# Patient Record
Sex: FEMALE | Race: WHITE | NOT HISPANIC OR LATINO | Employment: UNEMPLOYED | URBAN - METROPOLITAN AREA
[De-identification: names, ages, dates, MRNs, and addresses within clinical notes are randomized per-mention and may not be internally consistent; named-entity substitution may affect disease eponyms.]

---

## 2018-02-09 ENCOUNTER — TRANSFERRED RECORDS (OUTPATIENT)
Dept: HEALTH INFORMATION MANAGEMENT | Facility: CLINIC | Age: 53
End: 2018-02-09

## 2018-03-14 ENCOUNTER — OFFICE VISIT (OUTPATIENT)
Dept: CARDIOLOGY | Facility: CLINIC | Age: 53
End: 2018-03-14
Payer: COMMERCIAL

## 2018-03-14 ENCOUNTER — OFFICE VISIT (OUTPATIENT)
Dept: INTERNAL MEDICINE | Facility: CLINIC | Age: 53
End: 2018-03-14
Payer: COMMERCIAL

## 2018-03-14 VITALS
WEIGHT: 282 LBS | SYSTOLIC BLOOD PRESSURE: 126 MMHG | TEMPERATURE: 97.1 F | BODY MASS INDEX: 44.26 KG/M2 | OXYGEN SATURATION: 96 % | DIASTOLIC BLOOD PRESSURE: 70 MMHG | HEART RATE: 94 BPM | HEIGHT: 67 IN

## 2018-03-14 VITALS
OXYGEN SATURATION: 100 % | WEIGHT: 282 LBS | DIASTOLIC BLOOD PRESSURE: 70 MMHG | SYSTOLIC BLOOD PRESSURE: 126 MMHG | BODY MASS INDEX: 44.26 KG/M2 | HEIGHT: 67 IN | HEART RATE: 70 BPM

## 2018-03-14 DIAGNOSIS — H53.9 VISUAL DISTURBANCE: Primary | ICD-10-CM

## 2018-03-14 DIAGNOSIS — R00.2 PALPITATIONS: Primary | ICD-10-CM

## 2018-03-14 DIAGNOSIS — R07.89 ATYPICAL CHEST PAIN: ICD-10-CM

## 2018-03-14 DIAGNOSIS — E73.9 LACTOSE INTOLERANCE IN ADULT: ICD-10-CM

## 2018-03-14 DIAGNOSIS — D47.09 MAST CELL DISORDER: ICD-10-CM

## 2018-03-14 PROCEDURE — 99204 OFFICE O/P NEW MOD 45 MIN: CPT | Performed by: INTERNAL MEDICINE

## 2018-03-14 RX ORDER — EPINEPHRINE 0.3 MG/.3ML
0.3 INJECTION SUBCUTANEOUS PRN
COMMUNITY

## 2018-03-14 RX ORDER — ALBUTEROL SULFATE 90 UG/1
2 AEROSOL, METERED RESPIRATORY (INHALATION) EVERY 4 HOURS PRN
COMMUNITY

## 2018-03-14 RX ORDER — ALPRAZOLAM 0.25 MG
0.25 TABLET ORAL 3 TIMES DAILY PRN
COMMUNITY
End: 2018-05-17 | Stop reason: SINTOL

## 2018-03-14 RX ORDER — ONDANSETRON 4 MG/1
4 TABLET, FILM COATED ORAL PRN
COMMUNITY

## 2018-03-14 RX ORDER — SIMETHICONE 125 MG
125 TABLET,CHEWABLE ORAL 4 TIMES DAILY PRN
COMMUNITY

## 2018-03-14 RX ORDER — SOLIFENACIN SUCCINATE 10 MG/1
10 TABLET, FILM COATED ORAL DAILY
COMMUNITY

## 2018-03-14 RX ORDER — ACETAMINOPHEN 500 MG
500-1000 TABLET ORAL EVERY 6 HOURS PRN
COMMUNITY

## 2018-03-14 RX ORDER — LOPERAMIDE HCL 2 MG
2 CAPSULE ORAL PRN
COMMUNITY

## 2018-03-14 RX ORDER — TRAMADOL HYDROCHLORIDE 50 MG/1
50 TABLET ORAL PRN
COMMUNITY

## 2018-03-14 RX ORDER — SUCRALFATE ORAL 1 G/10ML
1 SUSPENSION ORAL 4 TIMES DAILY
COMMUNITY

## 2018-03-14 RX ORDER — CHOLECALCIFEROL (VITAMIN D3) 50 MCG
TABLET ORAL DAILY
COMMUNITY

## 2018-03-14 RX ORDER — FAMOTIDINE 20 MG/1
20 TABLET, FILM COATED ORAL 2 TIMES DAILY
COMMUNITY

## 2018-03-14 RX ORDER — METHOCARBAMOL 750 MG/1
TABLET, FILM COATED ORAL 3 TIMES DAILY PRN
COMMUNITY

## 2018-03-14 RX ORDER — TRIAMCINOLONE ACETONIDE 0.25 MG/G
CREAM TOPICAL PRN
COMMUNITY

## 2018-03-14 ASSESSMENT — PAIN SCALES - GENERAL: PAINLEVEL: NO PAIN (0)

## 2018-03-14 NOTE — PROGRESS NOTES
SUBJECTIVE:   Lyla Reyes is a 52 year old female who presents to clinic today for the following health issues:      Chief Complaint   Patient presents with     Establish Care     discuss several health issues     Patient is here for a second opinion she has multiple different concerns she sees multiple specialists including GI, psychiatry, ophthalmology and neuro-ophthalmology.  And allergy.    She is not happy with her primary because she does not get results back and has been told the results are normal and another doctor say that they are abnormal.    Some deficiencies and getting b12 shots.      Then saw allergist, Dr Craig in [facility].  Allergic to beef, chicken, tree nuts, lactose, and gluten.    Shingles and nerve pain for 7 weeks.     Seen neuro-opthamologist and has had some blurred vision.    Some atypical chest pain with the shingles then ekg maybe showed enlarged heart.      Question of her vision disturbance.  This has happened many times since the fall.  Has seen opthamology and they thought it was a stroke or migraine.  Nothing seen from her eyes.  Eye associate in [facility].  Never has had an echo in the past, opthamology wondered about a clot in the heart and strokes.  Had brain MRI last week at [facility], that was normal per her report.  No headaches with her visual changes.  Eyes get tired then a headache.      No past medical history on file.  Limited as the patient gave me current information not past information    No past surgical history on file.   Previous stomach surgery for a paraesophageal hernia    Current Outpatient Prescriptions   Medication     ondansetron (ZOFRAN) 4 MG tablet     traMADol (ULTRAM) 50 MG tablet     bismuth subsalicylate (PEPTO BISMOL) 262 MG/15ML suspension     sucralfate (CARAFATE) 1 GM/10ML suspension     EPINEPHrine (EPIPEN/ADRENACLICK/OR ANY BX GENERIC EQUIV) 0.3 MG/0.3ML injection 2-pack     famotidine (PEPCID) 20 MG tablet     simethicone (GAS RELIEF EXTRA  "STRENGTH) 125 MG CHEW chewable tablet     loperamide (IMODIUM) 2 MG capsule     acetaminophen (TYLENOL) 500 MG tablet     methocarbamol (ROBAXIN) 750 MG tablet     solifenacin (VESICARE) 10 MG tablet     triamcinolone (KENALOG) 0.025 % cream     albuterol (PROAIR HFA/PROVENTIL HFA/VENTOLIN HFA) 108 (90 BASE) MCG/ACT Inhaler     Cholecalciferol (VITAMIN D3) 2000 UNITS TABS     ALPRAZolam (XANAX) 0.25 MG tablet     No current facility-administered medications for this visit.    Most of her medications are as needed only taking the Pepcid on a daily basis    Social History   Substance Use Topics     Smoking status: Former Smoker     Smokeless tobacco: Never Used      Comment: quit in 1985     Alcohol use Not on file     Patient lives in [place]    Physical Exam  /70  Pulse 94  Temp 97.1  F (36.2  C) (Temporal)  Ht 5' 7\" (1.702 m)  Wt 282 lb (127.9 kg)  SpO2 96%  BMI 44.17 kg/m2  General Appearance-healthy, alert, no distress  Walks with a walker, wearing glasses    ASSESSMENT:  52-year-old very complex woman with a long medical history comes in looking for a second opinion mostly concerned about her vision which is changed over the last couple months.  She has had blurred vision and episodes.  During these episodes she seen ophthalmology and seen neuro-ophthalmology.  Those records are not available.  They tell her it is not her eyes but possibly a migraine.  She also reports that they said there was some grainy findings on her eye exam management back in 3 days and those were gone.  She did have an MRI done and that report is with her and shows no signs of stroke, no abnormalities.    We discussed the possibility of a migraine but she really has no headaches or prodrome.  Discussed that migraines can give atypical symptoms.  Could be related to diet, lack of sleep once the trigger for her migraine.  She already has allergies to foods and is on a limited diet.  I recommended she see neurology and offered " a neurology referral to [facility] but she lives in [place].    Obviously the patient is frustrated I have no obvious answer for her today.  We discussed the best option to find a new primary in [place] where she lives she can follow-up with me but would need to be seen basically on a weekly basis to learn her health history on the complexities of it.  Referrals with them he made the [place] which will be further from her home.    I did tell her I am not able to treat her mast cell disease as I don't see that very often.  Recommended referral to the [facility], unfortunately Dr Bartholomew has left there.    The patient left the clinic was going down to an appointment in cardiology.    I spent greater than 50% of this 45 minute visit in counseling and coordination of care in direct face to face care, discussing work up and visual concerns.      Electronically signed by Hugo Knowles MD

## 2018-03-14 NOTE — PROGRESS NOTES
"HISTORY:     Lyla Reyes is a pleasant 52-year-old female presenting with her 2 daughters today.  He is here for evaluation of chest pain and palpitations.  She has no documented previous cardiac diagnoses and her only cardiac risk factors are obesity and a positive family history (father underwent bypass surgery in his late 40s).    Lyla reports that he has a history of shingles and has chronic chest discomfort seems more superficial and has always been attributed to her previous shingles.  About a month ago she had an episode of chest tightness which felt different than her usual chest discomfort and was associated with intermittent shooting pains.  The discomfort was intermittent itself lasting 1 or 2 minutes at a time and occurring over about an hour or so.  This discomfort radiated to her left arm and shoulder with aching and numbness.  He was also associated with nausea and vomiting and a sense of lightheadedness.  She also states that \"I just did not feel right\".  She was seen in urgent care in [place] where her troponin was found to be negative and ECG showed no ischemic changes, and she was discharged.  She reports that she has not had any previous cardiac evaluation other than a stress test which was more than 7 years ago, and a Holter monitor done about a year ago.    The patient also reports that she has had palpitations dating back for many years.  They te patient reports that while she was monitored she had her typical palpitations and she did progress her nd to wax and wane in severity coming on for a day or 2 and then resolving for several days.  In general she has palpitations every couple days.  These episodes tend to be random and she has a sense of her heart racing.  Sometimes she is lightheaded and feels like she may pass out.  She recalls one syncopal episode but that was at answered about a decade ago.  She feels that generally these palpitations are random but she thinks that if she is " under a lot of stress she may be more likely to have palpitations, although this relationship is not 1-1.    I reviewed the Holter monitor done at [facility] in April 2017.  That study showed 47 PACs, a 10 beat run of PAT, and 21 isolated PVCs.  The patient reports that while monitored she had her typical symptoms and she did make 9 diary entries of palpitations which seemed to correlate with brief PAT at times.  Shortness of breath did not correlate with any arrhythmias and her sense of fluttering occurred with PVCs.    Cardiac risk factors are mostly negative.  Patient smoked for 1 year when she was young and is not currently using cigarettes.  She has prediabetes but has not been diagnosed as having diabetes.  Her cholesterol has always been normal though I do not have those values to personally review.  There is no history of hypertension and as mentioned above her father underwent bypass surgery in his late 40s, no other known family history of CAD.      ASSESSMENT/PLAN:    1.  Chest pain.  Fairly atypical, no exertional component.  Cardiac risk factors are fairly minimal.  The patient had a hysterectomy but not an oophorectomy more than a decade ago and she is not sure if she has gone through menopause yet.  I reviewed her outside ECG which shows Q waves in leads III and aVF suggesting a previous inferior infarct.  I will arrange an echocardiogram to evaluate for wall motion abnormalities and structural abnormalities, as well as a Lexiscan study to evaluate for ischemia.  2.  Palpitations.  She has had a full evaluation including a Holter monitor.  Her echocardiogram will assure us that she does not have structural abnormalities, although her exam does not suggest such a problem.  I talked to her about the possibility of initiating beta-blocker as an antiarrhythmic, and because she has some episodes of PAT which is to be part of her issue with palpitations.  At this point she wants to think more about whether  she wants to get another medication.  I do not think it is terribly important from a safety standpoint that she use a beta-blocker.  I will plan on having her back in a month and we will review that discussion then.    Thank you for asking me to participate in your patient's care.  Please do not hesitate to call if I can be of further assistance.    Orders Placed This Encounter   Procedures     NM Lexiscan stress test (nuc card)     Follow-Up with Cardiologist     Echocardiogram     No orders of the defined types were placed in this encounter.    There are no discontinued medications.      Encounter Diagnoses   Name Primary?     Palpitations Yes     Atypical chest pain        CURRENT MEDICATIONS:  Current Outpatient Prescriptions   Medication Sig Dispense Refill     ondansetron (ZOFRAN) 4 MG tablet Take 4 mg by mouth as needed for nausea       traMADol (ULTRAM) 50 MG tablet Take 50 mg by mouth as needed for moderate pain       bismuth subsalicylate (PEPTO BISMOL) 262 MG/15ML suspension Take 15 mLs by mouth every 4 hours as needed for indigestion       sucralfate (CARAFATE) 1 GM/10ML suspension Take 1 g by mouth 4 times daily       EPINEPHrine (EPIPEN/ADRENACLICK/OR ANY BX GENERIC EQUIV) 0.3 MG/0.3ML injection 2-pack Inject 0.3 mg into the muscle as needed for anaphylaxis       famotidine (PEPCID) 20 MG tablet Take 20 mg by mouth 2 times daily       simethicone (GAS RELIEF EXTRA STRENGTH) 125 MG CHEW chewable tablet Take 125 mg by mouth 4 times daily as needed for intestinal gas       loperamide (IMODIUM) 2 MG capsule Take 2 mg by mouth as needed for diarrhea       acetaminophen (TYLENOL) 500 MG tablet Take 500-1,000 mg by mouth every 6 hours as needed for mild pain       methocarbamol (ROBAXIN) 750 MG tablet Take by mouth 3 times daily as needed for muscle spasms       solifenacin (VESICARE) 10 MG tablet Take 10 mg by mouth daily       triamcinolone (KENALOG) 0.025 % cream Apply topically as needed for irritation        albuterol (PROAIR HFA/PROVENTIL HFA/VENTOLIN HFA) 108 (90 BASE) MCG/ACT Inhaler Inhale 2 puffs into the lungs every 4 hours as needed for shortness of breath / dyspnea or wheezing       Cholecalciferol (VITAMIN D3) 2000 UNITS TABS Take by mouth daily       ALPRAZolam (XANAX) 0.25 MG tablet Take 0.25 mg by mouth 3 times daily as needed for anxiety         ALLERGIES     Allergies   Allergen Reactions     Avocado      Beef-Derived Products      Bees      Chicken Allergy      Ciprofloxacin Other (See Comments)     Muscle pain, sob, rib and chest pain     Clomipramine Other (See Comments)     Mood changes, aggression, irritability     Clonazepam Other (See Comments)     Depression, suicidal     Doxycycline Other (See Comments)     Vomiting, headache, nausea     Escitalopram Hives     Gluten Meal      Grass      Hylan G-F 20 Other (See Comments)     Swelling, pain and stiffness     Lactose      Lorazepam Other (See Comments)     Suicidal, mood changes, irritability, depression     Mildew      Milnacipran Other (See Comments)     Suicidal, mood changes     Mold      Nefazodone Other (See Comments)     Nausea, vomiting, headache     Oxycodone Other (See Comments)     euphoria     Paroxetine Hives     Pregabalin Other (See Comments)     Extreme swelling in feet and legs     Prozac [Fluoxetine] Other (See Comments)     Vomiting and dehydration     Topiramate Other (See Comments)     Severe cough and sob     Tree Nuts [Nuts]      Vilazodone Other (See Comments)     Muscle pain, sob, rib and chest pain     Gabapentin Rash       PAST MEDICAL HISTORY:  No past medical history on file.    PAST SURGICAL HISTORY:  No past surgical history on file.    FAMILY HISTORY:  No family history on file.    SOCIAL HISTORY:  Social History     Social History     Marital status: Single     Spouse name: N/A     Number of children: N/A     Years of education: N/A     Social History Main Topics     Smoking status: Former Smoker     Smokeless  "tobacco: Never Used      Comment: quit in 1985     Alcohol use Not on file     Drug use: Not on file     Sexual activity: Not Currently     Other Topics Concern     Not on file     Social History Narrative     No narrative on file       Review of Systems:  Skin:  Negative     Eyes:  Positive for glasses  ENT:  Negative    Respiratory:  Positive for shortness of breath  Cardiovascular:  Negative for;edema Positive for;chest pain;lightheadedness;dizziness;fatigue;palpitations  Gastroenterology: Negative    Genitourinary:  Negative    Musculoskeletal:  Negative    Neurologic:  Negative    Psychiatric:  Positive for sleep disturbances  Heme/Lymph/Imm:  Positive for allergies  Endocrine:  Positive for      Physical Exam:  Vitals: /70  Pulse 70  Ht 1.702 m (5' 7\")  Wt 127.9 kg (282 lb)  SpO2 100%  BMI 44.17 kg/m2    Constitutional:  cooperative, alert and oriented, well developed, well nourished, in no acute distress morbidly obese      Skin:  warm and dry to the touch        Head:  normocephalic        Eyes:  no xanthalasma        ENT:  no pallor or cyanosis, dentition good        Neck:  carotid pulses are full and equal bilaterally, JVP normal, no carotid bruit        Chest:  normal breath sounds, clear to auscultation, normal A-P diameter, normal symmetry, normal respiratory excursion, no use of accessory muscles        Cardiac: regular rhythm, normal S1/S2, no S3 or S4, apical impulse not displaced, no murmurs, gallops or rubs                  Abdomen:  abdomen soft;BS normoactive        Vascular: pulses full and equal                                      Extremities and Back:           Neurological:  no gross motor deficits          Recent Lab Results:  LIPID RESULTS:  No results found for: CHOL, HDL, LDL, TRIG, CHOLHDLRATIO    LIVER ENZYME RESULTS:  No results found for: AST, ALT    CBC RESULTS:  No results found for: WBC, RBC, HGB, HCT, MCV, MCH, MCHC, RDW, PLT    BMP RESULTS:  No results found for: NA, " POTASSIUM, CHLORIDE, CO2, ANIONGAP, GLC, BUN, CR, GFRESTIMATED, GFRESTBLACK, TALA     A1C RESULTS:  No results found for: A1C    INR RESULTS:  No results found for: INR      Minesh Marquez MD, [facility]    CC  No referring provider defined for this encounter.

## 2018-03-14 NOTE — NURSING NOTE
"Chief Complaint   Patient presents with     Establish Care     discuss several health issues       Initial /70  Pulse 94  Temp 97.1  F (36.2  C) (Temporal)  Ht 5' 7\" (1.702 m)  Wt 282 lb (127.9 kg)  SpO2 96%  BMI 44.17 kg/m2 Estimated body mass index is 44.17 kg/(m^2) as calculated from the following:    Height as of this encounter: 5' 7\" (1.702 m).    Weight as of this encounter: 282 lb (127.9 kg).  Medication Reconciliation: complete    "

## 2018-03-14 NOTE — MR AVS SNAPSHOT
"              After Visit Summary   3/14/2018    Lyla Reyes    MRN: [id number]           Patient Information     Date Of Birth          1965        Visit Information        Provider Department      3/14/2018 10:00 AM Minesh Marquez MD [facility]        Today's Diagnoses     Palpitations    -  1    Atypical chest pain           Follow-ups after your visit        Additional Services     Follow-Up with Cardiologist                 Future tests that were ordered for you today     Open Future Orders        Priority Expected Expires Ordered    Follow-Up with Cardiologist Routine 4/13/2018 3/14/2019 3/14/2018    NM Lexiscan stress test (nuc card) Routine 3/21/2018 3/14/2019 3/14/2018    Echocardiogram Routine 3/21/2018 3/14/2019 3/14/2018            Who to contact     If you have questions or need follow up information about today's clinic visit or your schedule please contact [facility] directly at [phone].  Normal or non-critical lab and imaging results will be communicated to you by [employer], letter or phone within 4 business days after the clinic has received the results. If you do not hear from us within 7 days, please contact the clinic through [employer] or phone. If you have a critical or abnormal lab result, we will notify you by phone as soon as possible.  Submit refill requests through [employer] or call your pharmacy and they will forward the refill request to us. Please allow 3 business days for your refill to be completed.          Additional Information About Your Visit        Smalldealshart Information     [employer] lets you send messages to your doctor, view your test results, renew your prescriptions, schedule appointments and more. To sign up, go to [email or web address] . Click on \"Log in\" on the left side of the screen, which will take you to the Welcome page. Then click on \"Sign up Now\" on the right side " "of the page.     You will be asked to enter the access code listed below, as well as some personal information. Please follow the directions to create your username and password.     Your access code is: [email or web address]  Expires: 2018  8:23 AM     Your access code will  in 90 days. If you need help or a new code, please call your [place] clinic or [phone].        Care EveryWhere ID     This is your Care EveryWhere ID. This could be used by other organizations to access your [place] medical records  [id number]        Your Vitals Were     Pulse Height Pulse Oximetry BMI (Body Mass Index)          70 1.702 m (5' 7\") 100% 44.17 kg/m2         Blood Pressure from Last 3 Encounters:   18 126/70   18 126/70    Weight from Last 3 Encounters:   18 127.9 kg (282 lb)   18 127.9 kg (282 lb)               Primary Care Provider Office Phone # Fax #    Hugo Knowles [phone]       [facility] [address] [place] [address]        Equal Access to Services     [facility]: Hadii aad ku hadasho Soomaali, waaxda luqadaha, qaybta kaalmada adeegyada, waxay idiin hayaan adeeg kharash la'marion ah. So [facility] [phone].    ATENCIÓN: Si habla español, tiene a akins disposición servicios gratuitos de asistencia lingüística. Llame al [phone].    We comply with applicable federal civil rights laws and [place] laws. We do not discriminate on the basis of race, color, national origin, age, disability, sex, sexual orientation, or gender identity.            Thank you!     Thank you for choosing [facility]  for your care. Our goal is always to provide you with excellent care. Hearing back from our patients is one way we can continue to improve our services. Please take a few minutes to complete the written survey that you may receive in the mail after your visit with us. Thank you!             Your Updated Medication List - " Protect others around you: Learn how to safely use, store and throw away your medicines at www.disposemymeds.org.          This list is accurate as of 3/14/18 10:37 AM.  Always use your most recent med list.                   Brand Name Dispense Instructions for use Diagnosis    acetaminophen 500 MG tablet    TYLENOL     Take 500-1,000 mg by mouth every 6 hours as needed for mild pain        albuterol 108 (90 BASE) MCG/ACT Inhaler    PROAIR HFA/PROVENTIL HFA/VENTOLIN HFA     Inhale 2 puffs into the lungs every 4 hours as needed for shortness of breath / dyspnea or wheezing        bismuth subsalicylate 262 MG/15ML suspension    PEPTO BISMOL     Take 15 mLs by mouth every 4 hours as needed for indigestion        CARAFATE 1 GM/10ML suspension   Generic drug:  sucralfate      Take 1 g by mouth 4 times daily        EPINEPHrine 0.3 MG/0.3ML injection 2-pack    EPIPEN/ADRENACLICK/or ANY BX GENERIC EQUIV     Inject 0.3 mg into the muscle as needed for anaphylaxis        famotidine 20 MG tablet    PEPCID     Take 20 mg by mouth 2 times daily        GAS RELIEF EXTRA STRENGTH 125 MG Chew chewable tablet   Generic drug:  simethicone      Take 125 mg by mouth 4 times daily as needed for intestinal gas        loperamide 2 MG capsule    IMODIUM     Take 2 mg by mouth as needed for diarrhea        methocarbamol 750 MG tablet    ROBAXIN     Take by mouth 3 times daily as needed for muscle spasms        ondansetron 4 MG tablet    ZOFRAN     Take 4 mg by mouth as needed for nausea        solifenacin 10 MG tablet    VESICARE     Take 10 mg by mouth daily        traMADol 50 MG tablet    ULTRAM     Take 50 mg by mouth as needed for moderate pain        triamcinolone 0.025 % cream    KENALOG     Apply topically as needed for irritation        Vitamin D3 2000 UNITS Tabs      Take by mouth daily        XANAX 0.25 MG tablet   Generic drug:  ALPRAZolam      Take 0.25 mg by mouth 3 times daily as needed for anxiety

## 2018-03-14 NOTE — LETTER
"3/14/2018    Hugo Knowles MD  [facility] [address] [place] [address]    RE: Lyla Reyes       Dear Colleague,    I had the pleasure of seeing Lyla Reyes in the [facility].    HISTORY:     Lyla Reyes is a pleasant 52-year-old female presenting with her 2 daughters today.  He is here for evaluation of chest pain and palpitations.  She has no documented previous cardiac diagnoses and her only cardiac risk factors are obesity and a positive family history (father underwent bypass surgery in his late 40s).    Lyla reports that he has a history of shingles and has chronic chest discomfort seems more superficial and has always been attributed to her previous shingles.  About a month ago she had an episode of chest tightness which felt different than her usual chest discomfort and was associated with intermittent shooting pains.  The discomfort was intermittent itself lasting 1 or 2 minutes at a time and occurring over about an hour or so.  This discomfort radiated to her left arm and shoulder with aching and numbness.  He was also associated with nausea and vomiting and a sense of lightheadedness.  She also states that \"I just did not feel right\".  She was seen in urgent care in [place] where her troponin was found to be negative and ECG showed no ischemic changes, and she was discharged.  She reports that she has not had any previous cardiac evaluation other than a stress test which was more than 7 years ago, and a Holter monitor done about a year ago.    The patient also reports that she has had palpitations dating back for many years.  They te patient reports that while she was monitored she had her typical palpitations and she did progress her nd to wax and wane in severity coming on for a day or 2 and then resolving for several days.  In general she has palpitations every couple days.  These episodes tend to be random and she has a sense of her " heart racing.  Sometimes she is lightheaded and feels like she may pass out.  She recalls one syncopal episode but that was at answered about a decade ago.  She feels that generally these palpitations are random but she thinks that if she is under a lot of stress she may be more likely to have palpitations, although this relationship is not 1-1.    I reviewed the Holter monitor done at [facility] in April 2017.  That study showed 47 PACs, a 10 beat run of PAT, and 21 isolated PVCs.  The patient reports that while monitored she had her typical symptoms and she did make 9 diary entries of palpitations which seemed to correlate with brief PAT at times.  Shortness of breath did not correlate with any arrhythmias and her sense of fluttering occurred with PVCs.    Cardiac risk factors are mostly negative.  Patient smoked for 1 year when she was young and is not currently using cigarettes.  She has prediabetes but has not been diagnosed as having diabetes.  Her cholesterol has always been normal though I do not have those values to personally review.  There is no history of hypertension and as mentioned above her father underwent bypass surgery in his late 40s, no other known family history of CAD.      ASSESSMENT/PLAN:    1.  Chest pain.  Fairly atypical, no exertional component.  Cardiac risk factors are fairly minimal.  The patient had a hysterectomy but not an oophorectomy more than a decade ago and she is not sure if she has gone through menopause yet.  I reviewed her outside ECG which shows Q waves in leads III and aVF suggesting a previous inferior infarct.  I will arrange an echocardiogram to evaluate for wall motion abnormalities and structural abnormalities, as well as a Lexiscan study to evaluate for ischemia.  2.  Palpitations.  She has had a full evaluation including a Holter monitor.  Her echocardiogram will assure us that she does not have structural abnormalities, although her exam does not suggest such a  problem.  I talked to her about the possibility of initiating beta-blocker as an antiarrhythmic, and because she has some episodes of PAT which is to be part of her issue with palpitations.  At this point she wants to think more about whether she wants to get another medication.  I do not think it is terribly important from a safety standpoint that she use a beta-blocker.  I will plan on having her back in a month and we will review that discussion then.    Thank you for asking me to participate in your patient's care.  Please do not hesitate to call if I can be of further assistance.    Orders Placed This Encounter   Procedures     NM Lexiscan stress test (nuc card)     Follow-Up with Cardiologist     Echocardiogram     No orders of the defined types were placed in this encounter.    There are no discontinued medications.      Encounter Diagnoses   Name Primary?     Palpitations Yes     Atypical chest pain        CURRENT MEDICATIONS:  Current Outpatient Prescriptions   Medication Sig Dispense Refill     ondansetron (ZOFRAN) 4 MG tablet Take 4 mg by mouth as needed for nausea       traMADol (ULTRAM) 50 MG tablet Take 50 mg by mouth as needed for moderate pain       bismuth subsalicylate (PEPTO BISMOL) 262 MG/15ML suspension Take 15 mLs by mouth every 4 hours as needed for indigestion       sucralfate (CARAFATE) 1 GM/10ML suspension Take 1 g by mouth 4 times daily       EPINEPHrine (EPIPEN/ADRENACLICK/OR ANY BX GENERIC EQUIV) 0.3 MG/0.3ML injection 2-pack Inject 0.3 mg into the muscle as needed for anaphylaxis       famotidine (PEPCID) 20 MG tablet Take 20 mg by mouth 2 times daily       simethicone (GAS RELIEF EXTRA STRENGTH) 125 MG CHEW chewable tablet Take 125 mg by mouth 4 times daily as needed for intestinal gas       loperamide (IMODIUM) 2 MG capsule Take 2 mg by mouth as needed for diarrhea       acetaminophen (TYLENOL) 500 MG tablet Take 500-1,000 mg by mouth every 6 hours as needed for mild pain        methocarbamol (ROBAXIN) 750 MG tablet Take by mouth 3 times daily as needed for muscle spasms       solifenacin (VESICARE) 10 MG tablet Take 10 mg by mouth daily       triamcinolone (KENALOG) 0.025 % cream Apply topically as needed for irritation       albuterol (PROAIR HFA/PROVENTIL HFA/VENTOLIN HFA) 108 (90 BASE) MCG/ACT Inhaler Inhale 2 puffs into the lungs every 4 hours as needed for shortness of breath / dyspnea or wheezing       Cholecalciferol (VITAMIN D3) 2000 UNITS TABS Take by mouth daily       ALPRAZolam (XANAX) 0.25 MG tablet Take 0.25 mg by mouth 3 times daily as needed for anxiety         ALLERGIES     Allergies   Allergen Reactions     Avocado      Beef-Derived Products      Bees      Chicken Allergy      Ciprofloxacin Other (See Comments)     Muscle pain, sob, rib and chest pain     Clomipramine Other (See Comments)     Mood changes, aggression, irritability     Clonazepam Other (See Comments)     Depression, suicidal     Doxycycline Other (See Comments)     Vomiting, headache, nausea     Escitalopram Hives     Gluten Meal      Grass      Hylan G-F 20 Other (See Comments)     Swelling, pain and stiffness     Lactose      Lorazepam Other (See Comments)     Suicidal, mood changes, irritability, depression     Mildew      Milnacipran Other (See Comments)     Suicidal, mood changes     Mold      Nefazodone Other (See Comments)     Nausea, vomiting, headache     Oxycodone Other (See Comments)     euphoria     Paroxetine Hives     Pregabalin Other (See Comments)     Extreme swelling in feet and legs     Prozac [Fluoxetine] Other (See Comments)     Vomiting and dehydration     Topiramate Other (See Comments)     Severe cough and sob     Tree Nuts [Nuts]      Vilazodone Other (See Comments)     Muscle pain, sob, rib and chest pain     Gabapentin Rash       PAST MEDICAL HISTORY:  No past medical history on file.    PAST SURGICAL HISTORY:  No past surgical history on file.    FAMILY HISTORY:  No family history  "on file.    SOCIAL HISTORY:  Social History     Social History     Marital status: Single     Spouse name: N/A     Number of children: N/A     Years of education: N/A     Social History Main Topics     Smoking status: Former Smoker     Smokeless tobacco: Never Used      Comment: quit in 1985     Alcohol use Not on file     Drug use: Not on file     Sexual activity: Not Currently     Other Topics Concern     Not on file     Social History Narrative     No narrative on file       Review of Systems:  Skin:  Negative     Eyes:  Positive for glasses  ENT:  Negative    Respiratory:  Positive for shortness of breath  Cardiovascular:  Negative for;edema Positive for;chest pain;lightheadedness;dizziness;fatigue;palpitations  Gastroenterology: Negative    Genitourinary:  Negative    Musculoskeletal:  Negative    Neurologic:  Negative    Psychiatric:  Positive for sleep disturbances  Heme/Lymph/Imm:  Positive for allergies  Endocrine:  Positive for      Physical Exam:  Vitals: /70  Pulse 70  Ht 1.702 m (5' 7\")  Wt 127.9 kg (282 lb)  SpO2 100%  BMI 44.17 kg/m2    Constitutional:  cooperative, alert and oriented, well developed, well nourished, in no acute distress morbidly obese      Skin:  warm and dry to the touch        Head:  normocephalic        Eyes:  no xanthalasma        ENT:  no pallor or cyanosis, dentition good        Neck:  carotid pulses are full and equal bilaterally, JVP normal, no carotid bruit        Chest:  normal breath sounds, clear to auscultation, normal A-P diameter, normal symmetry, normal respiratory excursion, no use of accessory muscles        Cardiac: regular rhythm, normal S1/S2, no S3 or S4, apical impulse not displaced, no murmurs, gallops or rubs                  Abdomen:  abdomen soft;BS normoactive        Vascular: pulses full and equal                                      Extremities and Back:           Neurological:  no gross motor deficits          Recent Lab Results:  LIPID " RESULTS:  No results found for: CHOL, HDL, LDL, TRIG, CHOLHDLRATIO    LIVER ENZYME RESULTS:  No results found for: AST, ALT    CBC RESULTS:  No results found for: WBC, RBC, HGB, HCT, MCV, MCH, MCHC, RDW, PLT    BMP RESULTS:  No results found for: NA, POTASSIUM, CHLORIDE, CO2, ANIONGAP, GLC, BUN, CR, GFRESTIMATED, GFRESTBLACK, TALA     A1C RESULTS:  No results found for: A1C    INR RESULTS:  No results found for: INR      Thank you for allowing me to participate in the care of your patient.    Sincerely,     Minesh Marquez MD     [facility]

## 2018-03-14 NOTE — MR AVS SNAPSHOT
"              After Visit Summary   3/14/2018    Lyla Reyes    MRN: [id number]           Patient Information     Date Of Birth          1965        Visit Information        Provider Department      3/14/2018 8:15 AM Hugo Knowles MD [facility]         Follow-ups after your visit        Your next 10 appointments already scheduled     Mar 14, 2018 10:00 AM CDT   New Visit with Minesh Marquez MD   [facility] ([facility])    [address] [place] [id number] [phone]              Who to contact     If you have questions or need follow up information about today's clinic visit or your schedule please contact [facility] directly at [phone].  Normal or non-critical lab and imaging results will be communicated to you by MyChart, letter or phone within 4 business days after the clinic has received the results. If you do not hear from us within 7 days, please contact the clinic through MyChart or phone. If you have a critical or abnormal lab result, we will notify you by phone as soon as possible.  Submit refill requests through [employer] or call your pharmacy and they will forward the refill request to us. Please allow 3 business days for your refill to be completed.          Additional Information About Your Visit        MyChart Information     [employer] lets you send messages to your doctor, view your test results, renew your prescriptions, schedule appointments and more. To sign up, go to [email or web address] . Click on \"Log in\" on the left side of the screen, which will take you to the Welcome page. Then click on \"Sign up Now\" on the right side of the page.     You will be asked to enter the access code listed below, as well as some personal information. Please follow the directions to create your username and password.     Your access code is: [email or web address]  Expires: 6/12/2018  8:23 AM   " "  Your access code will  in 90 days. If you need help or a new code, please call your [facility] clinic or [phone].        Care EveryWhere ID     This is your Care EveryWhere ID. This could be used by other organizations to access your [facility] medical records  [id number]        Your Vitals Were     Pulse Temperature Height Pulse Oximetry BMI (Body Mass Index)       94 97.1  F (36.2  C) (Temporal) 5' 7\" (1.702 m) 96% 44.17 kg/m2        Blood Pressure from Last 3 Encounters:   18 126/70    Weight from Last 3 Encounters:   18 282 lb (127.9 kg)              Today, you had the following     No orders found for display       Primary Care Provider Office Phone # Fax #    Hugo Knowles [phone]       [facility] [address] [place] [address]        Equal Access to Services     CRISTIN NAZARIO : Hadii aad ku hadasho Soomaali, waaxda luqadaha, qaybta kaalmada adeegyada, waxay idiin hayaan adeeg kharash la'aan . So [facility] [phone].    ATENCIÓN: Si habla español, tiene a akins disposición servicios gratuitos de asistencia lingüística. Bassam al [phone].    We comply with applicable federal civil rights laws and [place] laws. We do not discriminate on the basis of race, color, national origin, age, disability, sex, sexual orientation, or gender identity.            Thank you!     Thank you for choosing [facility]  for your care. Our goal is always to provide you with excellent care. Hearing back from our patients is one way we can continue to improve our services. Please take a few minutes to complete the written survey that you may receive in the mail after your visit with us. Thank you!             Your Updated Medication List - Protect others around you: Learn how to safely use, store and throw away your medicines at www.disposemymeds.org.      Notice  As of 3/14/2018  8:23 AM    You have not been prescribed any medications.      "

## 2018-03-18 ENCOUNTER — HEALTH MAINTENANCE LETTER (OUTPATIENT)
Age: 53
End: 2018-03-18

## 2018-03-22 ENCOUNTER — HOSPITAL ENCOUNTER (OUTPATIENT)
Dept: NUCLEAR MEDICINE | Facility: CLINIC | Age: 53
Setting detail: NUCLEAR MEDICINE
Discharge: HOME | End: 2018-03-22
Attending: INTERNAL MEDICINE | Admitting: INTERNAL MEDICINE
Payer: COMMERCIAL

## 2018-03-22 ENCOUNTER — HOSPITAL ENCOUNTER (OUTPATIENT)
Dept: CARDIOLOGY | Facility: CLINIC | Age: 53
Discharge: HOME | End: 2018-03-22
Attending: INTERNAL MEDICINE | Admitting: INTERNAL MEDICINE
Payer: COMMERCIAL

## 2018-03-22 DIAGNOSIS — R00.2 PALPITATIONS: ICD-10-CM

## 2018-03-22 DIAGNOSIS — R07.89 ATYPICAL CHEST PAIN: ICD-10-CM

## 2018-03-22 PROCEDURE — A9502 TC99M TETROFOSMIN: HCPCS | Performed by: INTERNAL MEDICINE

## 2018-03-22 PROCEDURE — 93306 TTE W/DOPPLER COMPLETE: CPT

## 2018-03-22 PROCEDURE — 93017 CV STRESS TEST TRACING ONLY: CPT

## 2018-03-22 PROCEDURE — 93018 CV STRESS TEST I&R ONLY: CPT | Performed by: INTERNAL MEDICINE

## 2018-03-22 PROCEDURE — 93016 CV STRESS TEST SUPVJ ONLY: CPT | Performed by: INTERNAL MEDICINE

## 2018-03-22 PROCEDURE — 78452 HT MUSCLE IMAGE SPECT MULT: CPT | Mod: 26 | Performed by: INTERNAL MEDICINE

## 2018-03-22 PROCEDURE — 34300033 ZZH RX 343: Performed by: INTERNAL MEDICINE

## 2018-03-22 PROCEDURE — 25000128 H RX IP 250 OP 636: Performed by: INTERNAL MEDICINE

## 2018-03-22 PROCEDURE — 93306 TTE W/DOPPLER COMPLETE: CPT | Mod: 26 | Performed by: INTERNAL MEDICINE

## 2018-03-22 RX ORDER — REGADENOSON 0.08 MG/ML
0.4 INJECTION, SOLUTION INTRAVENOUS ONCE
Status: COMPLETED | OUTPATIENT
Start: 2018-03-22 | End: 2018-03-22

## 2018-03-22 RX ADMIN — Medication 31.7 MILLICURIE: at 09:55

## 2018-03-22 RX ADMIN — REGADENOSON 0.4 MG: 0.08 INJECTION, SOLUTION INTRAVENOUS at 09:51

## 2018-03-23 ENCOUNTER — TELEPHONE (OUTPATIENT)
Dept: CARDIOLOGY | Facility: CLINIC | Age: 53
End: 2018-03-23

## 2018-03-23 NOTE — TELEPHONE ENCOUNTER
RESULTS     Echo performed on 03-21-18     The visual ejection fraction is estimated at 65-70%.  The right ventricle is normal in size and function.  No regional wall motion abnormalities noted.  --------------------------------------------------------------------------------------    Results were reviewed with patient over the phone.   Patient had no questions.     Baldev FAN  [facility]

## 2018-03-27 ENCOUNTER — HOSPITAL ENCOUNTER (OUTPATIENT)
Dept: NUCLEAR MEDICINE | Facility: CLINIC | Age: 53
Setting detail: NUCLEAR MEDICINE
Discharge: HOME | End: 2018-03-27
Attending: INTERNAL MEDICINE | Admitting: INTERNAL MEDICINE
Payer: COMMERCIAL

## 2018-03-27 PROCEDURE — A9502 TC99M TETROFOSMIN: HCPCS | Performed by: INTERNAL MEDICINE

## 2018-03-27 PROCEDURE — 78452 HT MUSCLE IMAGE SPECT MULT: CPT

## 2018-03-27 PROCEDURE — 34300033 ZZH RX 343: Performed by: INTERNAL MEDICINE

## 2018-03-27 RX ADMIN — Medication 33.4 MCI.: at 08:50

## 2018-03-29 ENCOUNTER — TELEPHONE (OUTPATIENT)
Dept: CARDIOLOGY | Facility: CLINIC | Age: 53
End: 2018-03-29

## 2018-03-29 NOTE — TELEPHONE ENCOUNTER
Echo  Date performed: 03-22-18     The visual ejection fraction is estimated at 65-70%.  The right ventricle is normal in size and function.  The left ventricle is normal in size.   Left ventricular diastolic function is normal.  No regional wall motion abnormalities noted.     -------------------    NUC  Date performed: 03-27-18    1.  Myocardial perfusion imaging using single isotope technique  demonstrated no significant perfusion defect consistent with  significant ischemia or infarct. There is mild decrease in apical   activity, rest greater than stress, most likely consistent with soft  tissue attenuation.     2. Gated images demonstrated normal size left ventricle with vigorous  overall contractility and no significant regional wall motion  abnormality.  The left ventricular systolic function is hyperdynamic  with ejection fraction 76%.    3. Compared to the prior study from..... not applicable.    --------------------------------------------------------------------------------------------------    Notes Recorded by Minesh Marquez MD on 3/29/2018 at 9:43 AM  Nuc scan and echo reviewed.  No previous MI as ECG suggested.  I think I have her coming back for an early appointment to review these studies and decide on b-blocker therapy.     ---------------------------------------------------------------------------------------------------     Results and recommendations were reviewed with patient over the phone. Patient was advised to keep her appt with Dr. Marquez on 04-16-18. Patient had no questions.     Baldev FAN  [facility]

## 2018-05-17 ENCOUNTER — OFFICE VISIT (OUTPATIENT)
Dept: CARDIOLOGY | Facility: CLINIC | Age: 53
End: 2018-05-17
Payer: COMMERCIAL

## 2018-05-17 VITALS
DIASTOLIC BLOOD PRESSURE: 82 MMHG | SYSTOLIC BLOOD PRESSURE: 116 MMHG | WEIGHT: 289.3 LBS | BODY MASS INDEX: 45.4 KG/M2 | HEIGHT: 67 IN | OXYGEN SATURATION: 95 % | HEART RATE: 76 BPM

## 2018-05-17 DIAGNOSIS — R07.89 ATYPICAL CHEST PAIN: ICD-10-CM

## 2018-05-17 DIAGNOSIS — R00.2 PALPITATIONS: Primary | ICD-10-CM

## 2018-05-17 PROCEDURE — 99214 OFFICE O/P EST MOD 30 MIN: CPT | Performed by: INTERNAL MEDICINE
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
## 2018-05-17 NOTE — MR AVS SNAPSHOT
"              After Visit Summary   2018    Lyla Reyes    MRN: [id number]           Patient Information     Date Of Birth          1965        Visit Information        Provider Department      2018 1:00 PM Minesh Marquez MD [facility]         Follow-ups after your visit        Who to contact     If you have questions or need follow up information about today's clinic visit or your schedule please contact [facility] directly at [phone].  Normal or non-critical lab and imaging results will be communicated to you by MyChart, letter or phone within 4 business days after the clinic has received the results. If you do not hear from us within 7 days, please contact the clinic through [employer] or phone. If you have a critical or abnormal lab result, we will notify you by phone as soon as possible.  Submit refill requests through [employer] or call your pharmacy and they will forward the refill request to us. Please allow 3 business days for your refill to be completed.          Additional Information About Your Visit        MyChart Information     [employer] lets you send messages to your doctor, view your test results, renew your prescriptions, schedule appointments and more. To sign up, go to [email or web address] . Click on \"Log in\" on the left side of the screen, which will take you to the Welcome page. Then click on \"Sign up Now\" on the right side of the page.     You will be asked to enter the access code listed below, as well as some personal information. Please follow the directions to create your username and password.     Your access code is: [email or web address]  Expires: 2018  8:23 AM     Your access code will  in 90 days. If you need help or a new code, please call your [place] clinic or [phone].        Care EveryWhere ID     This is your Care EveryWhere ID. This could be used by other " "organizations to access your [place] medical records  [id number]        Your Vitals Were     Pulse Height Pulse Oximetry BMI (Body Mass Index)          76 1.702 m (5' 7\") 95% 45.31 kg/m2         Blood Pressure from Last 3 Encounters:   05/17/18 116/82   03/14/18 126/70   03/14/18 126/70    Weight from Last 3 Encounters:   05/17/18 131.2 kg (289 lb 4.8 oz)   03/14/18 127.9 kg (282 lb)   03/14/18 127.9 kg (282 lb)              Today, you had the following     No orders found for display         Today's Medication Changes          These changes are accurate as of 5/17/18  1:46 PM.  If you have any questions, ask your nurse or doctor.               Stop taking these medicines if you haven't already. Please contact your care team if you have questions.     XANAX 0.25 MG tablet   Generic drug:  ALPRAZolam                    Primary Care Provider Office Phone # Fax #    Hugo Knowles [phone]       [address] [place] [address]        Equal Access to Services     [facility]: Hadii brandon weston hadcrispin [place], waaxda luqadaha, qaybta kaalmaarin son, tommy manzo. So [facility] [place] [facility] [phone].    ATENCIÓN: Si habla español, tiene a akins disposición servicios gratuitos de asistencia lingüística. Bassam al [phone].    We comply with applicable federal civil rights laws and [place] laws. We do not discriminate on the basis of race, color, national origin, age, disability, sex, sexual orientation, or gender identity.            Thank you!     Thank you for choosing [facility]  for your care. Our goal is always to provide you with excellent care. Hearing back from our patients is one way we can continue to improve our services. Please take a few minutes to complete the written survey that you may receive in the mail after your visit with us. Thank you!             Your Updated Medication List - Protect others around you: Learn " how to safely use, store and throw away your medicines at www.disposemymeds.org.          This list is accurate as of 5/17/18  1:46 PM.  Always use your most recent med list.                   Brand Name Dispense Instructions for use Diagnosis    acetaminophen 500 MG tablet    TYLENOL     Take 500-1,000 mg by mouth every 6 hours as needed for mild pain        albuterol 108 (90 Base) MCG/ACT Inhaler    PROAIR HFA/PROVENTIL HFA/VENTOLIN HFA     Inhale 2 puffs into the lungs every 4 hours as needed for shortness of breath / dyspnea or wheezing        bismuth subsalicylate 262 MG/15ML suspension    PEPTO BISMOL     Take 15 mLs by mouth every 4 hours as needed for indigestion        CARAFATE 1 GM/10ML suspension   Generic drug:  sucralfate      Take 1 g by mouth 4 times daily        EPINEPHrine 0.3 MG/0.3ML injection 2-pack    EPIPEN/ADRENACLICK/or ANY BX GENERIC EQUIV     Inject 0.3 mg into the muscle as needed for anaphylaxis        famotidine 20 MG tablet    PEPCID     Take 20 mg by mouth 2 times daily        GAS RELIEF EXTRA STRENGTH 125 MG Chew chewable tablet   Generic drug:  simethicone      Take 125 mg by mouth 4 times daily as needed for intestinal gas        loperamide 2 MG capsule    IMODIUM     Take 2 mg by mouth as needed for diarrhea        methocarbamol 750 MG tablet    ROBAXIN     Take by mouth 3 times daily as needed for muscle spasms        ondansetron 4 MG tablet    ZOFRAN     Take 4 mg by mouth as needed for nausea        solifenacin 10 MG tablet    VESICARE     Take 10 mg by mouth daily        traMADol 50 MG tablet    ULTRAM     Take 50 mg by mouth as needed for moderate pain        triamcinolone 0.025 % cream    KENALOG     Apply topically as needed for irritation        Vitamin D3 2000 units Tabs      Take by mouth daily
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
## 2018-09-10 ENCOUNTER — TELEPHONE (OUTPATIENT)
Dept: INTERNAL MEDICINE | Facility: CLINIC | Age: 53
End: 2018-09-10

## 2018-09-10 NOTE — TELEPHONE ENCOUNTER
9/10/2018    Attempt 1    Contacted patient in regards to scheduling VIP mammogram  Message on voicemail     Patient is also due for - Preventive Health Screening Colonoscopy and Cervical/PAP    Comments:       Outreach   Annalise Scott

## 2018-09-18 NOTE — TELEPHONE ENCOUNTER
9/18/2018      Patient does all preventative health screenings outside of [place].             Outreach ,  Jada Oneal

## 2019-02-20 ENCOUNTER — TELEPHONE (OUTPATIENT)
Dept: INTERNAL MEDICINE | Facility: CLINIC | Age: 54
End: 2019-02-20

## 2019-02-20 NOTE — TELEPHONE ENCOUNTER
Please abstract the following data from this visit with this patient into the appropriate field in Epic:    Colonoscopy done on this date: 09/30/15 (approximately), by this group: [facility], results were Normal.   Mammogram done on this date: 02/09/2018 (approximately), by this group: [facility], results were Normal    All done on Care Everywhere..

## 2021-02-15 ENCOUNTER — OFFICE VISIT (OUTPATIENT)
Dept: CARDIOLOGY | Facility: CLINIC | Age: 56
End: 2021-02-15
Payer: COMMERCIAL

## 2021-02-15 VITALS
SYSTOLIC BLOOD PRESSURE: 120 MMHG | DIASTOLIC BLOOD PRESSURE: 86 MMHG | BODY MASS INDEX: 45.99 KG/M2 | HEIGHT: 67 IN | OXYGEN SATURATION: 94 % | WEIGHT: 293 LBS | HEART RATE: 68 BPM

## 2021-02-15 DIAGNOSIS — E66.01 MORBID OBESITY (H): ICD-10-CM

## 2021-02-15 DIAGNOSIS — R00.2 PALPITATIONS: Primary | ICD-10-CM

## 2021-02-15 PROCEDURE — 99215 OFFICE O/P EST HI 40 MIN: CPT | Performed by: INTERNAL MEDICINE

## 2021-02-15 RX ORDER — POLYETHYLENE GLYCOL 3350 17 G/17G
1 POWDER, FOR SOLUTION ORAL DAILY
COMMUNITY

## 2021-02-15 RX ORDER — OMEPRAZOLE 40 MG/1
40 CAPSULE, DELAYED RELEASE ORAL DAILY
COMMUNITY

## 2021-02-15 RX ORDER — METOPROLOL SUCCINATE 25 MG/1
25 TABLET, EXTENDED RELEASE ORAL DAILY
COMMUNITY

## 2021-02-15 RX ORDER — CLOTRIMAZOLE 1 %
CREAM (GRAM) TOPICAL 2 TIMES DAILY
COMMUNITY

## 2021-02-15 RX ORDER — SUMATRIPTAN 50 MG/1
50 TABLET, FILM COATED ORAL
COMMUNITY

## 2021-02-15 RX ORDER — LUBIPROSTONE 24 UG/1
24 CAPSULE ORAL 2 TIMES DAILY WITH MEALS
COMMUNITY

## 2021-02-15 RX ORDER — MULTIPLE VITAMINS W/ MINERALS TAB 9MG-400MCG
1 TAB ORAL DAILY
COMMUNITY

## 2021-02-15 RX ORDER — OLOPATADINE HYDROCHLORIDE 1 MG/ML
1 SOLUTION/ DROPS OPHTHALMIC 2 TIMES DAILY
COMMUNITY

## 2021-02-15 RX ORDER — TIZANIDINE HYDROCHLORIDE 4 MG/1
4 CAPSULE, GELATIN COATED ORAL 3 TIMES DAILY PRN
COMMUNITY

## 2021-02-15 RX ORDER — LIDOCAINE HYDROCHLORIDE 20 MG/ML
JELLY TOPICAL PRN
COMMUNITY

## 2021-02-15 RX ORDER — DOXEPIN HYDROCHLORIDE 10 MG/1
10 CAPSULE ORAL AT BEDTIME
COMMUNITY

## 2021-02-15 RX ORDER — HYDROCORTISONE 2.5 %
CREAM (GRAM) TOPICAL 2 TIMES DAILY
COMMUNITY

## 2021-02-15 RX ORDER — PYRIDOXINE HCL (VITAMIN B6) 25 MG
25 TABLET ORAL DAILY
COMMUNITY

## 2021-02-15 RX ORDER — NYSTATIN 100000 U/G
CREAM TOPICAL 2 TIMES DAILY
COMMUNITY

## 2021-02-15 RX ORDER — FUROSEMIDE 40 MG
40 TABLET ORAL DAILY
COMMUNITY

## 2021-02-15 ASSESSMENT — MIFFLIN-ST. JEOR: SCORE: 2060.99

## 2021-02-15 NOTE — PROGRESS NOTES
"HISTORY:    Lyla Reyes is a pleasant 55-year-old female who receives her care through [facility].  She requested today's visit through our system because of some frustration with her care through [facility].  Her main complaint is palpitations.    Lyla has a very complex past medical history for which she has 34 medications on her MAR and 26 listed allergies.  Her listed medical problems include ovarian cyst, PACs, SVT, mast cell activation, incontinence, pelvic floor dysfunction, suicidal ideation, migraines, hepatic steatosis, sensory integration disorder, lactulose intolerance, arthritis, bursitis, hypertension, depression, hemorrhoids, osteoarthritis, glucose intolerance, vitamin D deficiency, morbid obesity, posttraumatic stress disorder, panic disorder, obsessive-compulsive disorder, GERD, irritable bowels, fibromyalgia, intermittent asthma, chronic sinusitis.    Lyla was seen with complaints of palpitations in the summer 2018.  At that time she underwent an echocardiogram which was normal and a Holter monitor which showed no arrhythmias.  I reassured her that her rhythm was benign and offered to start her on beta-blocker for suppression of her sense of palpitations, but she declined.    More recently Lyla was evaluated at [facility] with a 30-day event monitor, although she states she only wore it for about 14 days because the patches kept falling off.  During that time she had numerous symptomatic spells.  Her rhythm during many of these spells was sinus without arrhythmia and at other times she was noted to have some brief episodes of atrial tachycardia, the longest being just 6 beats.  However, she reports that her symptoms have dramatically worsened since that time.    Currently Lyla complains of intermittent palpitations where it feels like her heart beats rapidly for a short period of time and then slows down and then repeats the process.  She feels that her heart rate \"goes all over the place\". " " On one occasion she summoned an ambulance and they confirmed that her \"heart was going all over the place\" but they did not transport her to the hospital so we have no records of the tracings.  While she has these spells her heart rate goes up and down frequently.  Her longest such spell lasted about 3 days.  At times she has spells of feeling lightheadedness and dizzy.  These often correlate with her palpitations but are sometimes experienced without palpitations.  The dizziness and lightheadedness can last anywhere from a few moments up to a couple of minutes.  When asked about the frequency of her events she states that she can have multiple episodes in a day or can go up to 2 weeks without any spells.  There are no other associated symptoms such as diaphoresis, syncope or chest pain.  She has watched for potential triggers and has not been able to identify any.  Her episodes seem to be random.  Some of her episodes occur at night.  She has a oximeter and that device shows a pulse of 150-160 when she has her sense of tachycardia and then dropping down to about 70 when her symptoms resolved, with the process then repeating itself again and again.    Lyla was started on Toprol-XL 12.5 mg daily and thought she noticed some improvement in the degree of palpitations, but they were still bothering her.  Her dose was then increased to 25 mg daily and she developed severe orthostatic dizziness, so her dose was split to 12.5 mg twice daily.  She is currently using that dose and believes that it is at least partly successful at suppressing her arrhythmias (less frequency), but she is still continuing to have these spells.  Her primary care physician felt that perhaps her mast cell activation syndrome is leading to POTS.      ASSESSMENT/PLAN:    1.  Palpitations.  It is unclear from her history and available chart information exactly what her palpitations might entail.  She had an event monitor last summer but her symptoms " have changed significantly since then.  Certainly her sense of tachycardia intermittently with a oximeter showing heart rate in the 150-160 range would suggest a recurrent tachycardia, likely SVT of some type.  Since all of her care has been through [facility] and she lives in [place] I suggested that she might want to see an electrophysiologist there for further evaluation.  I offered to initiate her evaluation by placing a 14-day Zio patch, but I explained that if she needs full EP evaluation she will need to go to either [place] or to the [facility].  After careful thought and consideration she elected to see an EP doctor in [place] for full evaluation.    Thank you for inviting me to participate in your patient's care.  I spent more than 50 minutes today reviewing the chart, interviewing the patient, and documenting the visit.    Chart documentation was completed, in part, with Dialog[employer] voice-recognition software. Even though reviewed, some grammatical, spelling, and word errors may remain.       No orders of the defined types were placed in this encounter.    Orders Placed This Encounter   Medications     Ascorbic Acid (VITAMIN C) 500 MG CHEW     Menthol, Topical Analgesic, (BIOFREEZE) 4 % GEL     clotrimazole (LOTRIMIN) 1 % external cream     Sig: Apply topically 2 times daily     diclofenac (VOLTAREN) 1 % topical gel     Sig: Apply topically 4 times daily     doxepin (SINEQUAN) 10 MG capsule     Sig: Take 10 mg by mouth At Bedtime     furosemide (LASIX) 40 MG tablet     Sig: Take 40 mg by mouth daily     phenylephrine-mineral oil-petrolatum (PREPARATION H) 0.25-14-74.9 % rectal ointment     Sig: Place rectally 2 times daily as needed for hemorrhoids     lidocaine (XYLOCAINE) 2 % external gel     Sig: as needed for moderate pain     lubiprostone (AMITIZA) 24 MCG capsule     Sig: Take 24 mcg by mouth 2 times daily (with meals)     metoprolol succinate ER (TOPROL-XL) 25 MG 24 hr tablet     Sig: Take 25 mg  by mouth daily Taking half tab twice daily     magnesium hydroxide (MILK OF MAGNESIA) 400 MG/5ML suspension     Sig: Take by mouth daily as needed for constipation or heartburn     nystatin (MYCOSTATIN) 544367 UNIT/GM external cream     Sig: Apply topically 2 times daily     olopatadine (PATANOL) 0.1 % ophthalmic solution     Si drop 2 times daily     omeprazole (PRILOSEC) 40 MG DR capsule     Sig: Take 40 mg by mouth daily     polyethylene glycol (MIRALAX) 17 GM/Dose powder     Sig: Take 1 capful by mouth daily     hydrocortisone 2.5 % cream     Sig: Apply topically 2 times daily     pyridOXINE (VITAMIN  B-6) 25 MG tablet     Sig: Take 25 mg by mouth daily     SUMAtriptan (IMITREX) 50 MG tablet     Sig: Take 50 mg by mouth at onset of headache for migraine     tiZANidine (ZANAFLEX) 4 MG capsule     Sig: Take 4 mg by mouth 3 times daily as needed for muscle spasms     multivitamin w/minerals (MULTI-VITAMIN) tablet     Sig: Take 1 tablet by mouth daily     There are no discontinued medications.    10 year ASCVD risk: The ASCVD Risk score ([place] DC Jr., et al., 2013) failed to calculate for the following reasons:    Cannot find a previous HDL lab    Cannot find a previous total cholesterol lab    No diagnosis found.    CURRENT MEDICATIONS:  Current Outpatient Medications   Medication Sig Dispense Refill     acetaminophen (TYLENOL) 500 MG tablet Take 500-1,000 mg by mouth every 6 hours as needed for mild pain       albuterol (PROAIR HFA/PROVENTIL HFA/VENTOLIN HFA) 108 (90 BASE) MCG/ACT Inhaler Inhale 2 puffs into the lungs every 4 hours as needed for shortness of breath / dyspnea or wheezing       Ascorbic Acid (VITAMIN C) 500 MG CHEW        bismuth subsalicylate (PEPTO BISMOL) 262 MG/15ML suspension Take 15 mLs by mouth every 4 hours as needed for indigestion       Cholecalciferol (VITAMIN D3) 2000 UNITS TABS Take by mouth daily       clotrimazole (LOTRIMIN) 1 % external cream Apply topically 2 times daily        diclofenac (VOLTAREN) 1 % topical gel Apply topically 4 times daily       doxepin (SINEQUAN) 10 MG capsule Take 10 mg by mouth At Bedtime       EPINEPHrine (EPIPEN/ADRENACLICK/OR ANY BX GENERIC EQUIV) 0.3 MG/0.3ML injection 2-pack Inject 0.3 mg into the muscle as needed for anaphylaxis       furosemide (LASIX) 40 MG tablet Take 40 mg by mouth daily       hydrocortisone 2.5 % cream Apply topically 2 times daily       lidocaine (XYLOCAINE) 2 % external gel as needed for moderate pain       loperamide (IMODIUM) 2 MG capsule Take 2 mg by mouth as needed for diarrhea       lubiprostone (AMITIZA) 24 MCG capsule Take 24 mcg by mouth 2 times daily (with meals)       magnesium hydroxide (MILK OF MAGNESIA) 400 MG/5ML suspension Take by mouth daily as needed for constipation or heartburn       Menthol, Topical Analgesic, (BIOFREEZE) 4 % GEL        metoprolol succinate ER (TOPROL-XL) 25 MG 24 hr tablet Take 25 mg by mouth daily Taking half tab twice daily       multivitamin w/minerals (MULTI-VITAMIN) tablet Take 1 tablet by mouth daily       nystatin (MYCOSTATIN) 594747 UNIT/GM external cream Apply topically 2 times daily       olopatadine (PATANOL) 0.1 % ophthalmic solution 1 drop 2 times daily       omeprazole (PRILOSEC) 40 MG DR capsule Take 40 mg by mouth daily       ondansetron (ZOFRAN) 4 MG tablet Take 4 mg by mouth as needed for nausea       phenylephrine-mineral oil-petrolatum (PREPARATION H) 0.25-14-74.9 % rectal ointment Place rectally 2 times daily as needed for hemorrhoids       polyethylene glycol (MIRALAX) 17 GM/Dose powder Take 1 capful by mouth daily       pyridOXINE (VITAMIN  B-6) 25 MG tablet Take 25 mg by mouth daily       solifenacin (VESICARE) 10 MG tablet Take 10 mg by mouth daily       SUMAtriptan (IMITREX) 50 MG tablet Take 50 mg by mouth at onset of headache for migraine       tiZANidine (ZANAFLEX) 4 MG capsule Take 4 mg by mouth 3 times daily as needed for muscle spasms       triamcinolone (KENALOG)  0.025 % cream Apply topically as needed for irritation       famotidine (PEPCID) 20 MG tablet Take 20 mg by mouth 2 times daily       methocarbamol (ROBAXIN) 750 MG tablet Take by mouth 3 times daily as needed for muscle spasms       simethicone (GAS RELIEF EXTRA STRENGTH) 125 MG CHEW chewable tablet Take 125 mg by mouth 4 times daily as needed for intestinal gas       sucralfate (CARAFATE) 1 GM/10ML suspension Take 1 g by mouth 4 times daily       traMADol (ULTRAM) 50 MG tablet Take 50 mg by mouth as needed for moderate pain         ALLERGIES     Allergies   Allergen Reactions     Avocado      Beef-Derived Products      Bees      Chicken Allergy      Ciprofloxacin Other (See Comments)     Muscle pain, sob, rib and chest pain     Clomipramine Other (See Comments)     Mood changes, aggression, irritability     Clonazepam Other (See Comments)     Depression, suicidal     Doxycycline Other (See Comments)     Vomiting, headache, nausea     Escitalopram Hives     Gluten Meal      Grass      Hydroxyzine Other (See Comments)     Suicidal ideation     Hylan G-F 20 Other (See Comments)     Swelling, pain and stiffness     Lactose      Lorazepam Other (See Comments)     Suicidal, mood changes, irritability, depression     Mildew      Milnacipran Other (See Comments)     Suicidal, mood changes     Mold      Nefazodone Other (See Comments)     Nausea, vomiting, headache     Oxycodone Other (See Comments)     euphoria     Paroxetine Hives     Pregabalin Other (See Comments)     Extreme swelling in feet and legs     Prozac [Fluoxetine] Other (See Comments)     Vomiting and dehydration     Topiramate Other (See Comments)     Severe cough and sob     Tree Nuts [Nuts]      Vilazodone Other (See Comments)     Muscle pain, sob, rib and chest pain     Xanax [Alprazolam] Other (See Comments)     Suicidal ideation     Gabapentin Rash       PAST MEDICAL HISTORY:  No past medical history on file.    PAST SURGICAL HISTORY:  Past Surgical  History:   Procedure Laterality Date     HYSTERECTOMY VAGINAL Bilateral 2010       FAMILY HISTORY:  No family history on file.    SOCIAL HISTORY:  Social History     Socioeconomic History     Marital status: Single     Spouse name: Not on file     Number of children: Not on file     Years of education: Not on file     Highest education level: Not on file   Occupational History     Not on file   Social Needs     Financial resource strain: Not on file     Food insecurity     Worry: Not on file     Inability: Not on file     Transportation needs     Medical: Not on file     Non-medical: Not on file   Tobacco Use     Smoking status: Former Smoker     Smokeless tobacco: Never Used     Tobacco comment: quit in 1985   Substance and Sexual Activity     Alcohol use: Not on file     Drug use: Not on file     Sexual activity: Not Currently   Lifestyle     Physical activity     Days per week: Not on file     Minutes per session: Not on file     Stress: Not on file   Relationships     Social connections     Talks on phone: Not on file     Gets together: Not on file     Attends Hindu service: Not on file     Active member of club or organization: Not on file     Attends meetings of clubs or organizations: Not on file     Relationship status: Not on file     Intimate partner violence     Fear of current or ex partner: Not on file     Emotionally abused: Not on file     Physically abused: Not on file     Forced sexual activity: Not on file   Other Topics Concern     Not on file   Social History Narrative     Not on file       Review of Systems:  Skin:  Negative     Eyes:  Positive for glasses;visual blurring  ENT:  Negative    Respiratory:  Positive for dyspnea on exertion  Cardiovascular:    Positive for;palpitations;chest pain;edema;lightheadedness;dizziness  Gastroenterology: Positive for constipation  Genitourinary:  Negative    Musculoskeletal:  Positive for joint pain  Neurologic:  Negative    Psychiatric:  Negative   "  Heme/Lymph/Imm:  Positive for allergies  Endocrine:  Negative      Physical Exam:  Vitals: /86 (BP Location: Left arm, Patient Position: Sitting, Cuff Size: Adult Large)   Pulse 68   Ht 1.702 m (5' 7\")   Wt 143.8 kg (317 lb 1.6 oz)   SpO2 94%   BMI 49.66 kg/m      Constitutional:  cooperative, alert and oriented, well developed, well nourished, in no acute distress morbidly obese      Skin:  warm and dry to the touch        Head:  normocephalic        Eyes:  sclera white;no nystagmus;no xanthalasma        ENT:  no pallor or cyanosis   masked    Neck:  carotid pulses are full and equal bilaterally, JVP normal, no carotid bruit        Chest:  normal breath sounds, clear to auscultation, normal A-P diameter, normal symmetry, normal respiratory excursion, no use of accessory muscles        Cardiac: regular rhythm, normal S1/S2, no S3 or S4, apical impulse not displaced, no murmurs, gallops or rubs                  Abdomen:  abdomen soft;BS normoactive        Vascular: pulses full and equal                                      Extremities and Back:           Neurological:  no gross motor deficits          Recent Lab Results:  LIPID RESULTS:  No results found for: CHOL, HDL, LDL, TRIG, CHOLHDLRATIO    LIVER ENZYME RESULTS:  No results found for: AST, ALT    CBC RESULTS:  No results found for: WBC, RBC, HGB, HCT, MCV, MCH, MCHC, RDW, PLT    BMP RESULTS:  No results found for: NA, POTASSIUM, CHLORIDE, CO2, ANIONGAP, GLC, BUN, CR, GFRESTIMATED, GFRESTBLACK, TALA     A1C RESULTS:  No results found for: A1C    INR RESULTS:  No results found for: INR      Minesh Marquez MD, [facility]    CC  No referring provider defined for this encounter.                  "

## 2021-02-15 NOTE — LETTER
2/15/2021    Hugo Knowles MD  [address] [facility] [address]    RE: Lyla Reyes       Dear Colleague,    I had the pleasure of seeing Lyla Reyes in the [facility].    HISTORY:    Lyla Reyes is a pleasant 55-year-old female who receives her care through [facility].  She requested today's visit through our system because of some frustration with her care through [facility].  Her main complaint is palpitations.    Lyla has a very complex past medical history for which she has 34 medications on her MAR and 26 listed allergies.  Her listed medical problems include ovarian cyst, PACs, SVT, mast cell activation, incontinence, pelvic floor dysfunction, suicidal ideation, migraines, hepatic steatosis, sensory integration disorder, lactulose intolerance, arthritis, bursitis, hypertension, depression, hemorrhoids, osteoarthritis, glucose intolerance, vitamin D deficiency, morbid obesity, posttraumatic stress disorder, panic disorder, obsessive-compulsive disorder, GERD, irritable bowels, fibromyalgia, intermittent asthma, chronic sinusitis.    Lyla was seen with complaints of palpitations in the summer 2018.  At that time she underwent an echocardiogram which was normal and a Holter monitor which showed no arrhythmias.  I reassured her that her rhythm was benign and offered to start her on beta-blocker for suppression of her sense of palpitations, but she declined.    More recently Lyla was evaluated at [facility] with a 30-day event monitor, although she states she only wore it for about 14 days because the patches kept falling off.  During that time she had numerous symptomatic spells.  Her rhythm during many of these spells was sinus without arrhythmia and at other times she was noted to have some brief episodes of atrial tachycardia, the longest being just 6 beats.  However, she reports that her symptoms have dramatically worsened  "since that time.    Currently Lyla complains of intermittent palpitations where it feels like her heart beats rapidly for a short period of time and then slows down and then repeats the process.  She feels that her heart rate \"goes all over the place\".  On one occasion she summoned an ambulance and they confirmed that her \"heart was going all over the place\" but they did not transport her to the hospital so we have no records of the tracings.  While she has these spells her heart rate goes up and down frequently.  Her longest such spell lasted about 3 days.  At times she has spells of feeling lightheadedness and dizzy.  These often correlate with her palpitations but are sometimes experienced without palpitations.  The dizziness and lightheadedness can last anywhere from a few moments up to a couple of minutes.  When asked about the frequency of her events she states that she can have multiple episodes in a day or can go up to 2 weeks without any spells.  There are no other associated symptoms such as diaphoresis, syncope or chest pain.  She has watched for potential triggers and has not been able to identify any.  Her episodes seem to be random.  Some of her episodes occur at night.  She has a oximeter and that device shows a pulse of 150-160 when she has her sense of tachycardia and then dropping down to about 70 when her symptoms resolved, with the process then repeating itself again and again.    Lyla was started on Toprol-XL 12.5 mg daily and thought she noticed some improvement in the degree of palpitations, but they were still bothering her.  Her dose was then increased to 25 mg daily and she developed severe orthostatic dizziness, so her dose was split to 12.5 mg twice daily.  She is currently using that dose and believes that it is at least partly successful at suppressing her arrhythmias (less frequency), but she is still continuing to have these spells.  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      ASSESSMENT/PLAN:    1.  Palpitations.  It is unclear from her history and available chart information exactly what her palpitations might entail.  She had an event monitor last summer but her symptoms have changed significantly since then.  Certainly her sense of tachycardia intermittently with a oximeter showing heart rate in the 150-160 range would suggest a recurrent tachycardia, likely SVT of some type.  Since all of her care has been through [facility] and she lives in [place] I suggested that she might want to see an electrophysiologist there for further evaluation.  I offered to initiate her evaluation by placing a 14-day Zio patch, but I explained that if she needs full EP evaluation she will need to go to either [place] or to the [facility].  After careful thought and consideration she elected to see an EP doctor in [place] for full evaluation.    Thank you for inviting me to participate in your patient's care.  I spent more than 50 minutes today reviewing the chart, interviewing the patient, and documenting the visit.    Chart documentation was completed, in part, with [employer] voice-recognition software. Even though reviewed, some grammatical, spelling, and word errors may remain.       No orders of the defined types were placed in this encounter.    Orders Placed This Encounter   Medications     Ascorbic Acid (VITAMIN C) 500 MG CHEW     Menthol, Topical Analgesic, (BIOFREEZE) 4 % GEL     clotrimazole (LOTRIMIN) 1 % external cream     Sig: Apply topically 2 times daily     diclofenac (VOLTAREN) 1 % topical gel     Sig: Apply topically 4 times daily     doxepin (SINEQUAN) 10 MG capsule     Sig: Take 10 mg by mouth At Bedtime     furosemide (LASIX) 40 MG tablet     Sig: Take 40 mg by mouth daily     phenylephrine-mineral oil-petrolatum (PREPARATION H) 0.25-14-74.9 % rectal ointment     Sig: Place rectally 2 times daily as needed for hemorrhoids      lidocaine (XYLOCAINE) 2 % external gel     Sig: as needed for moderate pain     lubiprostone (AMITIZA) 24 MCG capsule     Sig: Take 24 mcg by mouth 2 times daily (with meals)     metoprolol succinate ER (TOPROL-XL) 25 MG 24 hr tablet     Sig: Take 25 mg by mouth daily Taking half tab twice daily     magnesium hydroxide (MILK OF MAGNESIA) 400 MG/5ML suspension     Sig: Take by mouth daily as needed for constipation or heartburn     nystatin (MYCOSTATIN) 434143 UNIT/GM external cream     Sig: Apply topically 2 times daily     olopatadine (PATANOL) 0.1 % ophthalmic solution     Si drop 2 times daily     omeprazole (PRILOSEC) 40 MG DR capsule     Sig: Take 40 mg by mouth daily     polyethylene glycol (MIRALAX) 17 GM/Dose powder     Sig: Take 1 capful by mouth daily     hydrocortisone 2.5 % cream     Sig: Apply topically 2 times daily     pyridOXINE (VITAMIN  B-6) 25 MG tablet     Sig: Take 25 mg by mouth daily     SUMAtriptan (IMITREX) 50 MG tablet     Sig: Take 50 mg by mouth at onset of headache for migraine     tiZANidine (ZANAFLEX) 4 MG capsule     Sig: Take 4 mg by mouth 3 times daily as needed for muscle spasms     multivitamin w/minerals (MULTI-VITAMIN) tablet     Sig: Take 1 tablet by mouth daily     There are no discontinued medications.    10 year ASCVD risk: The ASCVD Risk score ([place] KRYSTAL Jr., et al., 2013) failed to calculate for the following reasons:    Cannot find a previous HDL lab    Cannot find a previous total cholesterol lab    No diagnosis found.    CURRENT MEDICATIONS:  Current Outpatient Medications   Medication Sig Dispense Refill     acetaminophen (TYLENOL) 500 MG tablet Take 500-1,000 mg by mouth every 6 hours as needed for mild pain       albuterol (PROAIR HFA/PROVENTIL HFA/VENTOLIN HFA) 108 (90 BASE) MCG/ACT Inhaler Inhale 2 puffs into the lungs every 4 hours as needed for shortness of breath / dyspnea or wheezing       Ascorbic Acid (VITAMIN C) 500 MG CHEW        bismuth subsalicylate  (PEPTO BISMOL) 262 MG/15ML suspension Take 15 mLs by mouth every 4 hours as needed for indigestion       Cholecalciferol (VITAMIN D3) 2000 UNITS TABS Take by mouth daily       clotrimazole (LOTRIMIN) 1 % external cream Apply topically 2 times daily       diclofenac (VOLTAREN) 1 % topical gel Apply topically 4 times daily       doxepin (SINEQUAN) 10 MG capsule Take 10 mg by mouth At Bedtime       EPINEPHrine (EPIPEN/ADRENACLICK/OR ANY BX GENERIC EQUIV) 0.3 MG/0.3ML injection 2-pack Inject 0.3 mg into the muscle as needed for anaphylaxis       furosemide (LASIX) 40 MG tablet Take 40 mg by mouth daily       hydrocortisone 2.5 % cream Apply topically 2 times daily       lidocaine (XYLOCAINE) 2 % external gel as needed for moderate pain       loperamide (IMODIUM) 2 MG capsule Take 2 mg by mouth as needed for diarrhea       lubiprostone (AMITIZA) 24 MCG capsule Take 24 mcg by mouth 2 times daily (with meals)       magnesium hydroxide (MILK OF MAGNESIA) 400 MG/5ML suspension Take by mouth daily as needed for constipation or heartburn       Menthol, Topical Analgesic, (BIOFREEZE) 4 % GEL        metoprolol succinate ER (TOPROL-XL) 25 MG 24 hr tablet Take 25 mg by mouth daily Taking half tab twice daily       multivitamin w/minerals (MULTI-VITAMIN) tablet Take 1 tablet by mouth daily       nystatin (MYCOSTATIN) 815748 UNIT/GM external cream Apply topically 2 times daily       olopatadine (PATANOL) 0.1 % ophthalmic solution 1 drop 2 times daily       omeprazole (PRILOSEC) 40 MG DR capsule Take 40 mg by mouth daily       ondansetron (ZOFRAN) 4 MG tablet Take 4 mg by mouth as needed for nausea       phenylephrine-mineral oil-petrolatum (PREPARATION H) 0.25-14-74.9 % rectal ointment Place rectally 2 times daily as needed for hemorrhoids       polyethylene glycol (MIRALAX) 17 GM/Dose powder Take 1 capful by mouth daily       pyridOXINE (VITAMIN  B-6) 25 MG tablet Take 25 mg by mouth daily       solifenacin (VESICARE) 10 MG tablet  Take 10 mg by mouth daily       SUMAtriptan (IMITREX) 50 MG tablet Take 50 mg by mouth at onset of headache for migraine       tiZANidine (ZANAFLEX) 4 MG capsule Take 4 mg by mouth 3 times daily as needed for muscle spasms       triamcinolone (KENALOG) 0.025 % cream Apply topically as needed for irritation       famotidine (PEPCID) 20 MG tablet Take 20 mg by mouth 2 times daily       methocarbamol (ROBAXIN) 750 MG tablet Take by mouth 3 times daily as needed for muscle spasms       simethicone (GAS RELIEF EXTRA STRENGTH) 125 MG CHEW chewable tablet Take 125 mg by mouth 4 times daily as needed for intestinal gas       sucralfate (CARAFATE) 1 GM/10ML suspension Take 1 g by mouth 4 times daily       traMADol (ULTRAM) 50 MG tablet Take 50 mg by mouth as needed for moderate pain         ALLERGIES     Allergies   Allergen Reactions     Avocado      Beef-Derived Products      Bees      Chicken Allergy      Ciprofloxacin Other (See Comments)     Muscle pain, sob, rib and chest pain     Clomipramine Other (See Comments)     Mood changes, aggression, irritability     Clonazepam Other (See Comments)     Depression, suicidal     Doxycycline Other (See Comments)     Vomiting, headache, nausea     Escitalopram Hives     Gluten Meal      Grass      Hydroxyzine Other (See Comments)     Suicidal ideation     Hylan G-F 20 Other (See Comments)     Swelling, pain and stiffness     Lactose      Lorazepam Other (See Comments)     Suicidal, mood changes, irritability, depression     Mildew      Milnacipran Other (See Comments)     Suicidal, mood changes     Mold      Nefazodone Other (See Comments)     Nausea, vomiting, headache     Oxycodone Other (See Comments)     euphoria     Paroxetine Hives     Pregabalin Other (See Comments)     Extreme swelling in feet and legs     Prozac [Fluoxetine] Other (See Comments)     Vomiting and dehydration     Topiramate Other (See Comments)     Severe cough and sob     Tree Nuts [Nuts]      Vilazodone  Other (See Comments)     Muscle pain, sob, rib and chest pain     Xanax [Alprazolam] Other (See Comments)     Suicidal ideation     Gabapentin Rash       PAST MEDICAL HISTORY:  No past medical history on file.    PAST SURGICAL HISTORY:  Past Surgical History:   Procedure Laterality Date     HYSTERECTOMY VAGINAL Bilateral 2010       FAMILY HISTORY:  No family history on file.    SOCIAL HISTORY:  Social History     Socioeconomic History     Marital status: Single     Spouse name: Not on file     Number of children: Not on file     Years of education: Not on file     Highest education level: Not on file   Occupational History     Not on file   Social Needs     Financial resource strain: Not on file     Food insecurity     Worry: Not on file     Inability: Not on file     Transportation needs     Medical: Not on file     Non-medical: Not on file   Tobacco Use     Smoking status: Former Smoker     Smokeless tobacco: Never Used     Tobacco comment: quit in 1985   Substance and Sexual Activity     Alcohol use: Not on file     Drug use: Not on file     Sexual activity: Not Currently   Lifestyle     Physical activity     Days per week: Not on file     Minutes per session: Not on file     Stress: Not on file   Relationships     Social connections     Talks on phone: Not on file     Gets together: Not on file     Attends Jain service: Not on file     Active member of club or organization: Not on file     Attends meetings of clubs or organizations: Not on file     Relationship status: Not on file     Intimate partner violence     Fear of current or ex partner: Not on file     Emotionally abused: Not on file     Physically abused: Not on file     Forced sexual activity: Not on file   Other Topics Concern     Not on file   Social History Narrative     Not on file       Review of Systems:  Skin:  Negative     Eyes:  Positive for glasses;visual blurring  ENT:  Negative    Respiratory:  Positive for dyspnea on  "exertion  Cardiovascular:    Positive for;palpitations;chest pain;edema;lightheadedness;dizziness  Gastroenterology: Positive for constipation  Genitourinary:  Negative    Musculoskeletal:  Positive for joint pain  Neurologic:  Negative    Psychiatric:  Negative    Heme/Lymph/Imm:  Positive for allergies  Endocrine:  Negative      Physical Exam:  Vitals: /86 (BP Location: Left arm, Patient Position: Sitting, Cuff Size: Adult Large)   Pulse 68   Ht 1.702 m (5' 7\")   Wt 143.8 kg (317 lb 1.6 oz)   SpO2 94%   BMI 49.66 kg/m      Constitutional:  cooperative, alert and oriented, well developed, well nourished, in no acute distress morbidly obese      Skin:  warm and dry to the touch        Head:  normocephalic        Eyes:  sclera white;no nystagmus;no xanthalasma        ENT:  no pallor or cyanosis   masked    Neck:  carotid pulses are full and equal bilaterally, JVP normal, no carotid bruit        Chest:  normal breath sounds, clear to auscultation, normal A-P diameter, normal symmetry, normal respiratory excursion, no use of accessory muscles        Cardiac: regular rhythm, normal S1/S2, no S3 or S4, apical impulse not displaced, no murmurs, gallops or rubs                  Abdomen:  abdomen soft;BS normoactive        Vascular: pulses full and equal                                      Extremities and Back:           Neurological:  no gross motor deficits          Recent Lab Results:  LIPID RESULTS:  No results found for: CHOL, HDL, LDL, TRIG, CHOLHDLRATIO    LIVER ENZYME RESULTS:  No results found for: AST, ALT    CBC RESULTS:  No results found for: WBC, RBC, HGB, HCT, MCV, MCH, MCHC, RDW, PLT    BMP RESULTS:  No results found for: NA, POTASSIUM, CHLORIDE, CO2, ANIONGAP, GLC, BUN, CR, GFRESTIMATED, GFRESTBLACK, TALA     A1C RESULTS:  No results found for: A1C    INR RESULTS:  No results found for: INR      Minesh Marquez MD, [facility]    CC  No referring provider defined for this " encounter.                      Thank you for allowing me to participate in the care of your patient.      Sincerely,     Minesh Marquez MD     [facility]  cc:   No referring provider defined for this encounter.

## 2021-02-15 NOTE — LETTER
2/15/2021    Hugo Knowles MD  [address] [facility] [address]    RE: Lyla Reyes       Dear Colleague,    I had the pleasure of seeing Lyla Reyes in the [facility].    HISTORY:    Lyla Reyes is a pleasant 55-year-old female who receives her care through [facility].  She requested today's visit through our system because of some frustration with her care through [facility].  Her main complaint is palpitations.    Lyla has a very complex past medical history for which she has 34 medications on her MAR and 26 listed allergies.  Her listed medical problems include ovarian cyst, PACs, SVT, mast cell activation, incontinence, pelvic floor dysfunction, suicidal ideation, migraines, hepatic steatosis, sensory integration disorder, lactulose intolerance, arthritis, bursitis, hypertension, depression, hemorrhoids, osteoarthritis, glucose intolerance, vitamin D deficiency, morbid obesity, posttraumatic stress disorder, panic disorder, obsessive-compulsive disorder, GERD, irritable bowels, fibromyalgia, intermittent asthma, chronic sinusitis.    Lyla was seen with complaints of palpitations in the summer 2018.  At that time she underwent an echocardiogram which was normal and a Holter monitor which showed no arrhythmias.  I reassured her that her rhythm was benign and offered to start her on beta-blocker for suppression of her sense of palpitations, but she declined.    More recently Lyla was evaluated at [facility] with a 30-day event monitor, although she states she only wore it for about 14 days because the patches kept falling off.  During that time she had numerous symptomatic spells.  Her rhythm during many of these spells was sinus without arrhythmia and at other times she was noted to have some brief episodes of atrial tachycardia, the longest being just 6 beats.  However, she reports that her symptoms have dramatically worsened  "since that time.    Currently Lyla complains of intermittent palpitations where it feels like her heart beats rapidly for a short period of time and then slows down and then repeats the process.  She feels that her heart rate \"goes all over the place\".  On one occasion she summoned an ambulance and they confirmed that her \"heart was going all over the place\" but they did not transport her to the hospital so we have no records of the tracings.  While she has these spells her heart rate goes up and down frequently.  Her longest such spell lasted about 3 days.  At times she has spells of feeling lightheadedness and dizzy.  These often correlate with her palpitations but are sometimes experienced without palpitations.  The dizziness and lightheadedness can last anywhere from a few moments up to a couple of minutes.  When asked about the frequency of her events she states that she can have multiple episodes in a day or can go up to 2 weeks without any spells.  There are no other associated symptoms such as diaphoresis, syncope or chest pain.  She has watched for potential triggers and has not been able to identify any.  Her episodes seem to be random.  Some of her episodes occur at night.  She has a oximeter and that device shows a pulse of 150-160 when she has her sense of tachycardia and then dropping down to about 70 when her symptoms resolved, with the process then repeating itself again and again.    Lyla was started on Toprol-XL 12.5 mg daily and thought she noticed some improvement in the degree of palpitations, but they were still bothering her.  Her dose was then increased to 25 mg daily and she developed severe orthostatic dizziness, so her dose was split to 12.5 mg twice daily.  She is currently using that dose and believes that it is at least partly successful at suppressing her arrhythmias (less frequency), but she is still continuing to have these spells.  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      ASSESSMENT/PLAN:    1.  Palpitations.  It is unclear from her history and available chart information exactly what her palpitations might entail.  She had an event monitor last summer but her symptoms have changed significantly since then.  Certainly her sense of tachycardia intermittently with a oximeter showing heart rate in the 150-160 range would suggest a recurrent tachycardia, likely SVT of some type.  Since all of her care has been through [facility] and she lives in [place] I suggested that she might want to see an electrophysiologist there for further evaluation.  I offered to initiate her evaluation by placing a 14-day Zio patch, but I explained that if she needs full EP evaluation she will need to go to either [place] or to the [facility].  After careful thought and consideration she elected to see an EP doctor in [place] for full evaluation.    Thank you for inviting me to participate in your patient's care.  I spent more than 50 minutes today reviewing the chart, interviewing the patient, and documenting the visit.    Chart documentation was completed, in part, with [employer] voice-recognition software. Even though reviewed, some grammatical, spelling, and word errors may remain.       No orders of the defined types were placed in this encounter.    Orders Placed This Encounter   Medications     Ascorbic Acid (VITAMIN C) 500 MG CHEW     Menthol, Topical Analgesic, (BIOFREEZE) 4 % GEL     clotrimazole (LOTRIMIN) 1 % external cream     Sig: Apply topically 2 times daily     diclofenac (VOLTAREN) 1 % topical gel     Sig: Apply topically 4 times daily     doxepin (SINEQUAN) 10 MG capsule     Sig: Take 10 mg by mouth At Bedtime     furosemide (LASIX) 40 MG tablet     Sig: Take 40 mg by mouth daily     phenylephrine-mineral oil-petrolatum (PREPARATION H) 0.25-14-74.9 % rectal ointment     Sig: Place rectally 2 times daily as needed for hemorrhoids      lidocaine (XYLOCAINE) 2 % external gel     Sig: as needed for moderate pain     lubiprostone (AMITIZA) 24 MCG capsule     Sig: Take 24 mcg by mouth 2 times daily (with meals)     metoprolol succinate ER (TOPROL-XL) 25 MG 24 hr tablet     Sig: Take 25 mg by mouth daily Taking half tab twice daily     magnesium hydroxide (MILK OF MAGNESIA) 400 MG/5ML suspension     Sig: Take by mouth daily as needed for constipation or heartburn     nystatin (MYCOSTATIN) 984980 UNIT/GM external cream     Sig: Apply topically 2 times daily     olopatadine (PATANOL) 0.1 % ophthalmic solution     Si drop 2 times daily     omeprazole (PRILOSEC) 40 MG DR capsule     Sig: Take 40 mg by mouth daily     polyethylene glycol (MIRALAX) 17 GM/Dose powder     Sig: Take 1 capful by mouth daily     hydrocortisone 2.5 % cream     Sig: Apply topically 2 times daily     pyridOXINE (VITAMIN  B-6) 25 MG tablet     Sig: Take 25 mg by mouth daily     SUMAtriptan (IMITREX) 50 MG tablet     Sig: Take 50 mg by mouth at onset of headache for migraine     tiZANidine (ZANAFLEX) 4 MG capsule     Sig: Take 4 mg by mouth 3 times daily as needed for muscle spasms     multivitamin w/minerals (MULTI-VITAMIN) tablet     Sig: Take 1 tablet by mouth daily     There are no discontinued medications.    10 year ASCVD risk: The ASCVD Risk score (Beulah KRYSTAL Jr., et al., 2013) failed to calculate for the following reasons:    Cannot find a previous HDL lab    Cannot find a previous total cholesterol lab    No diagnosis found.    CURRENT MEDICATIONS:  Current Outpatient Medications   Medication Sig Dispense Refill     acetaminophen (TYLENOL) 500 MG tablet Take 500-1,000 mg by mouth every 6 hours as needed for mild pain       albuterol (PROAIR HFA/PROVENTIL HFA/VENTOLIN HFA) 108 (90 BASE) MCG/ACT Inhaler Inhale 2 puffs into the lungs every 4 hours as needed for shortness of breath / dyspnea or wheezing       Ascorbic Acid (VITAMIN C) 500 MG CHEW        bismuth subsalicylate  (PEPTO BISMOL) 262 MG/15ML suspension Take 15 mLs by mouth every 4 hours as needed for indigestion       Cholecalciferol (VITAMIN D3) 2000 UNITS TABS Take by mouth daily       clotrimazole (LOTRIMIN) 1 % external cream Apply topically 2 times daily       diclofenac (VOLTAREN) 1 % topical gel Apply topically 4 times daily       doxepin (SINEQUAN) 10 MG capsule Take 10 mg by mouth At Bedtime       EPINEPHrine (EPIPEN/ADRENACLICK/OR ANY BX GENERIC EQUIV) 0.3 MG/0.3ML injection 2-pack Inject 0.3 mg into the muscle as needed for anaphylaxis       furosemide (LASIX) 40 MG tablet Take 40 mg by mouth daily       hydrocortisone 2.5 % cream Apply topically 2 times daily       lidocaine (XYLOCAINE) 2 % external gel as needed for moderate pain       loperamide (IMODIUM) 2 MG capsule Take 2 mg by mouth as needed for diarrhea       lubiprostone (AMITIZA) 24 MCG capsule Take 24 mcg by mouth 2 times daily (with meals)       magnesium hydroxide (MILK OF MAGNESIA) 400 MG/5ML suspension Take by mouth daily as needed for constipation or heartburn       Menthol, Topical Analgesic, (BIOFREEZE) 4 % GEL        metoprolol succinate ER (TOPROL-XL) 25 MG 24 hr tablet Take 25 mg by mouth daily Taking half tab twice daily       multivitamin w/minerals (MULTI-VITAMIN) tablet Take 1 tablet by mouth daily       nystatin (MYCOSTATIN) 302980 UNIT/GM external cream Apply topically 2 times daily       olopatadine (PATANOL) 0.1 % ophthalmic solution 1 drop 2 times daily       omeprazole (PRILOSEC) 40 MG DR capsule Take 40 mg by mouth daily       ondansetron (ZOFRAN) 4 MG tablet Take 4 mg by mouth as needed for nausea       phenylephrine-mineral oil-petrolatum (PREPARATION H) 0.25-14-74.9 % rectal ointment Place rectally 2 times daily as needed for hemorrhoids       polyethylene glycol (MIRALAX) 17 GM/Dose powder Take 1 capful by mouth daily       pyridOXINE (VITAMIN  B-6) 25 MG tablet Take 25 mg by mouth daily       solifenacin (VESICARE) 10 MG tablet  Take 10 mg by mouth daily       SUMAtriptan (IMITREX) 50 MG tablet Take 50 mg by mouth at onset of headache for migraine       tiZANidine (ZANAFLEX) 4 MG capsule Take 4 mg by mouth 3 times daily as needed for muscle spasms       triamcinolone (KENALOG) 0.025 % cream Apply topically as needed for irritation       famotidine (PEPCID) 20 MG tablet Take 20 mg by mouth 2 times daily       methocarbamol (ROBAXIN) 750 MG tablet Take by mouth 3 times daily as needed for muscle spasms       simethicone (GAS RELIEF EXTRA STRENGTH) 125 MG CHEW chewable tablet Take 125 mg by mouth 4 times daily as needed for intestinal gas       sucralfate (CARAFATE) 1 GM/10ML suspension Take 1 g by mouth 4 times daily       traMADol (ULTRAM) 50 MG tablet Take 50 mg by mouth as needed for moderate pain         ALLERGIES     Allergies   Allergen Reactions     Avocado      Beef-Derived Products      Bees      Chicken Allergy      Ciprofloxacin Other (See Comments)     Muscle pain, sob, rib and chest pain     Clomipramine Other (See Comments)     Mood changes, aggression, irritability     Clonazepam Other (See Comments)     Depression, suicidal     Doxycycline Other (See Comments)     Vomiting, headache, nausea     Escitalopram Hives     Gluten Meal      Grass      Hydroxyzine Other (See Comments)     Suicidal ideation     Hylan G-F 20 Other (See Comments)     Swelling, pain and stiffness     Lactose      Lorazepam Other (See Comments)     Suicidal, mood changes, irritability, depression     Mildew      Milnacipran Other (See Comments)     Suicidal, mood changes     Mold      Nefazodone Other (See Comments)     Nausea, vomiting, headache     Oxycodone Other (See Comments)     euphoria     Paroxetine Hives     Pregabalin Other (See Comments)     Extreme swelling in feet and legs     Prozac [Fluoxetine] Other (See Comments)     Vomiting and dehydration     Topiramate Other (See Comments)     Severe cough and sob     Tree Nuts [Nuts]      Vilazodone  Other (See Comments)     Muscle pain, sob, rib and chest pain     Xanax [Alprazolam] Other (See Comments)     Suicidal ideation     Gabapentin Rash       PAST MEDICAL HISTORY:  No past medical history on file.    PAST SURGICAL HISTORY:  Past Surgical History:   Procedure Laterality Date     HYSTERECTOMY VAGINAL Bilateral 2010       FAMILY HISTORY:  No family history on file.    SOCIAL HISTORY:  Social History     Socioeconomic History     Marital status: Single     Spouse name: Not on file     Number of children: Not on file     Years of education: Not on file     Highest education level: Not on file   Occupational History     Not on file   Social Needs     Financial resource strain: Not on file     Food insecurity     Worry: Not on file     Inability: Not on file     Transportation needs     Medical: Not on file     Non-medical: Not on file   Tobacco Use     Smoking status: Former Smoker     Smokeless tobacco: Never Used     Tobacco comment: quit in 1985   Substance and Sexual Activity     Alcohol use: Not on file     Drug use: Not on file     Sexual activity: Not Currently   Lifestyle     Physical activity     Days per week: Not on file     Minutes per session: Not on file     Stress: Not on file   Relationships     Social connections     Talks on phone: Not on file     Gets together: Not on file     Attends [employer] service: Not on file     Active member of club or organization: Not on file     Attends meetings of clubs or organizations: Not on file     Relationship status: Not on file     Intimate partner violence     Fear of current or ex partner: Not on file     Emotionally abused: Not on file     Physically abused: Not on file     Forced sexual activity: Not on file   Other Topics Concern     Not on file   Social History Narrative     Not on file       Review of Systems:  Skin:  Negative     Eyes:  Positive for glasses;visual blurring  ENT:  Negative    Respiratory:  Positive for dyspnea on  "exertion  Cardiovascular:    Positive for;palpitations;chest pain;edema;lightheadedness;dizziness  Gastroenterology: Positive for constipation  Genitourinary:  Negative    Musculoskeletal:  Positive for joint pain  Neurologic:  Negative    Psychiatric:  Negative    Heme/Lymph/Imm:  Positive for allergies  Endocrine:  Negative      Physical Exam:  Vitals: /86 (BP Location: Left arm, Patient Position: Sitting, Cuff Size: Adult Large)   Pulse 68   Ht 1.702 m (5' 7\")   Wt 143.8 kg (317 lb 1.6 oz)   SpO2 94%   BMI 49.66 kg/m      Constitutional:  cooperative, alert and oriented, well developed, well nourished, in no acute distress morbidly obese      Skin:  warm and dry to the touch        Head:  normocephalic        Eyes:  sclera white;no nystagmus;no xanthalasma        ENT:  no pallor or cyanosis   masked    Neck:  carotid pulses are full and equal bilaterally, JVP normal, no carotid bruit        Chest:  normal breath sounds, clear to auscultation, normal A-P diameter, normal symmetry, normal respiratory excursion, no use of accessory muscles        Cardiac: regular rhythm, normal S1/S2, no S3 or S4, apical impulse not displaced, no murmurs, gallops or rubs                  Abdomen:  abdomen soft;BS normoactive        Vascular: pulses full and equal                                      Extremities and Back:           Neurological:  no gross motor deficits          Recent Lab Results:  LIPID RESULTS:  No results found for: CHOL, HDL, LDL, TRIG, CHOLHDLRATIO    LIVER ENZYME RESULTS:  No results found for: AST, ALT    CBC RESULTS:  No results found for: WBC, RBC, HGB, HCT, MCV, MCH, MCHC, RDW, PLT    BMP RESULTS:  No results found for: NA, POTASSIUM, CHLORIDE, CO2, ANIONGAP, GLC, BUN, CR, GFRESTIMATED, GFRESTBLACK, TALA     A1C RESULTS:  No results found for: A1C    INR RESULTS:  No results found for: INR      Thank you for allowing me to participate in the care of your patient.    Sincerely,     Minesh Lopes" MD Jimmy     [facility]

## 2021-07-27 ENCOUNTER — TELEPHONE (OUTPATIENT)
Dept: CARDIOLOGY | Facility: CLINIC | Age: 56
End: 2021-07-27

## 2021-07-27 NOTE — TELEPHONE ENCOUNTER
Patient has been seeing Cardiology in [facility] at [facility].  She is having pain in her chest from Loop recorder and has had conflicting information from the EP Cardiologist and her PCP.  Last seen by Dr. Marquez and he stated if she wanted to have a follow up visit in [place] she could do that in the future.  Gave patient [place] scheduling number.  Patient verbalized understanding and agreed to plan of care.

## 2021-08-30 ENCOUNTER — DOCUMENTATION ONLY (OUTPATIENT)
Dept: CARDIOLOGY | Facility: CLINIC | Age: 56
End: 2021-08-30

## 2021-08-30 NOTE — PROGRESS NOTES
Patient was scheduled to see Dr. Marquez on 8/30/21. Patient was called to make sure she got the best possible care by the best provider to suite her needs (see messages below). Patient was confused and frustrated so she cancelled her appointment today 8/30/21 with Dr. Marquez.  reached out to patient to say that Dr. Marquez would still see patient today 8/30/21 if she wanted to be seen. Patient still did not want to reschedule.       FW: 8/30/21  Received: 6 days ago  Eleanor Springer RN  P [facility] Scheduling  Please reach out to patient to let her know that Dr. Marquez thinks she should be seen by EP for her second opinion since it is an EP issue. Please see his message below. She can still be seen by Dr. Marquez is she chooses to.   Thank you   Eleanor           Previous Messages       ----- Message -----   From: Minesh Marquez MD   Sent: 8/20/2021   9:20 AM CDT   To: Eleanor Springer RN   Subject: RE: 8/30/21                                       I can't find an EP note from [place], but presuming there is one and she wants a second opinion on an EP issue, she needs to see EP.  Her choice would be wait for Dr. Marcos, see someone in the [facility], or go somewhere else.  Angélica Leone?   EDWARD Marquez MD, [facility]     ----- Message -----   From: Eleanor Springer RN   Sent: 8/18/2021   9:38 AM CDT   To: Minesh Marquez MD   Subject: 8/30/21                                           Hi Dr. Marquez,   Could you review this patient's chart and let me know if you are okay with seeing her on 8/30 or if you would prefer her see EP. Dr. Marcos does not have any openings here until 11/4/21. Looks like patient did not want to travel to the [facility] to be seen by EP when you recommended it at her last visit so she saw an EP in [place] and is now wanting a second opinion from us.     Let me know what we should do after you look through her chart.   Thanks   Eleanor

## 2021-09-25 ENCOUNTER — HEALTH MAINTENANCE LETTER (OUTPATIENT)
Age: 56
End: 2021-09-25

## 2023-01-07 ENCOUNTER — HEALTH MAINTENANCE LETTER (OUTPATIENT)
Age: 58
End: 2023-01-07

## 2023-12-02 ENCOUNTER — HEALTH MAINTENANCE LETTER (OUTPATIENT)
Age: 58
End: 2023-12-02

## 2024-02-10 ENCOUNTER — HEALTH MAINTENANCE LETTER (OUTPATIENT)
Age: 59
End: 2024-02-10